# Patient Record
Sex: FEMALE | Race: WHITE | NOT HISPANIC OR LATINO | Employment: OTHER | URBAN - METROPOLITAN AREA
[De-identification: names, ages, dates, MRNs, and addresses within clinical notes are randomized per-mention and may not be internally consistent; named-entity substitution may affect disease eponyms.]

---

## 2018-01-30 ENCOUNTER — OFFICE VISIT (OUTPATIENT)
Dept: URGENT CARE | Facility: CLINIC | Age: 58
End: 2018-01-30
Payer: COMMERCIAL

## 2018-01-30 VITALS
WEIGHT: 154 LBS | RESPIRATION RATE: 20 BRPM | HEIGHT: 69 IN | TEMPERATURE: 98.9 F | SYSTOLIC BLOOD PRESSURE: 122 MMHG | BODY MASS INDEX: 22.81 KG/M2 | DIASTOLIC BLOOD PRESSURE: 80 MMHG | HEART RATE: 78 BPM | OXYGEN SATURATION: 97 %

## 2018-01-30 DIAGNOSIS — J06.9 VIRAL UPPER RESPIRATORY TRACT INFECTION: ICD-10-CM

## 2018-01-30 PROCEDURE — 99203 OFFICE O/P NEW LOW 30 MIN: CPT | Performed by: INTERNAL MEDICINE

## 2018-01-30 ASSESSMENT — ENCOUNTER SYMPTOMS
PSYCHIATRIC NEGATIVE: 1
SPUTUM PRODUCTION: 1
HEADACHES: 0
CARDIOVASCULAR NEGATIVE: 1
EYES NEGATIVE: 1
SHORTNESS OF BREATH: 0
FEVER: 0
PALPITATIONS: 0
NECK PAIN: 0
NEUROLOGICAL NEGATIVE: 1
CHILLS: 1
SORE THROAT: 1
BACK PAIN: 0
NAUSEA: 0
COUGH: 1
DIARRHEA: 0
MYALGIAS: 1
WEIGHT LOSS: 0
BLOOD IN STOOL: 0
DIZZINESS: 0
VOMITING: 0

## 2018-01-30 ASSESSMENT — PATIENT HEALTH QUESTIONNAIRE - PHQ9: CLINICAL INTERPRETATION OF PHQ2 SCORE: 0

## 2018-01-31 NOTE — PROGRESS NOTES
"Subjective:     Yanira Guerrier is a 57 y.o. female who presents for Nasal Congestion (Over a month stuffy nose , postnasal dripping, fatigue)       Patient is a pleasant 57-year-old female presents today for several days of nasal congestion and stuffy nose and mild cough and mild headache. Patient has not had a flu shot this year. Patient denies any rash. Does have a history of vertigo had small episode several days ago with mild nausea and vomiting none today.      Review of Systems   Constitutional: Positive for chills and malaise/fatigue. Negative for fever and weight loss.   HENT: Positive for congestion and sore throat.    Eyes: Negative.    Respiratory: Positive for cough and sputum production. Negative for shortness of breath.    Cardiovascular: Negative.  Negative for chest pain, palpitations and leg swelling.   Gastrointestinal: Negative for blood in stool, diarrhea, nausea and vomiting.   Genitourinary: Negative for dysuria, frequency and urgency.   Musculoskeletal: Positive for myalgias. Negative for back pain and neck pain.   Skin: Negative.  Negative for rash.   Neurological: Negative.  Negative for dizziness (negative headache) and headaches.   Endo/Heme/Allergies: Negative.    Psychiatric/Behavioral: Negative.      No Known Allergies   Objective:   /80   Pulse 78   Temp 37.2 °C (98.9 °F)   Resp 20   Ht 1.753 m (5' 9\")   Wt 69.9 kg (154 lb)   SpO2 97%   BMI 22.74 kg/m²   Physical Exam   Constitutional: She is oriented to person, place, and time. She appears well-developed and well-nourished. She is active. She has a sickly appearance. No distress.   HENT:   Head: Normocephalic and atraumatic.   Right Ear: External ear normal.   Left Ear: External ear normal.   Nose: Mucosal edema and rhinorrhea present.   Mouth/Throat: Posterior oropharyngeal erythema present. No oropharyngeal exudate.   Eyes: Conjunctivae and EOM are normal. Pupils are equal, round, and reactive to light. Right eye " exhibits no discharge. Left eye exhibits no discharge. No scleral icterus.   Neck: Normal range of motion. Neck supple. No JVD present. Carotid bruit is not present. No thyroid mass and no thyromegaly present.   Cardiovascular: Normal rate, regular rhythm, S1 normal, S2 normal and normal heart sounds.  Exam reveals no friction rub.    No murmur heard.  Pulmonary/Chest: Effort normal and breath sounds normal. No respiratory distress. She has no wheezes. She has no rales.   Abdominal: Soft. Bowel sounds are normal. She exhibits no distension and no mass. There is no hepatosplenomegaly. There is no tenderness. There is no rebound and no guarding.   Musculoskeletal: Normal range of motion. She exhibits no edema.   Lymphadenopathy:     She has no cervical adenopathy.   Neurological: She is alert and oriented to person, place, and time. She has normal strength. No cranial nerve deficit.   Skin: Skin is warm and dry. No rash noted. No erythema.   Psychiatric: She has a normal mood and affect. Her behavior is normal. Thought content normal.        Assessment/Plan:   Assessment    Differential diagnosis, natural history, supportive care, and indications for immediate follow-up discussed.    DX:  Viral URI    TX: Otc flu and cold medications   PO fluids   Rest   Tylenol   Follow up with PCP   RTC or ED for any worsening symptoms

## 2018-04-23 ENCOUNTER — OFFICE VISIT (OUTPATIENT)
Dept: MEDICAL GROUP | Facility: MEDICAL CENTER | Age: 58
End: 2018-04-23
Payer: COMMERCIAL

## 2018-04-23 VITALS
HEART RATE: 60 BPM | HEIGHT: 69 IN | DIASTOLIC BLOOD PRESSURE: 66 MMHG | TEMPERATURE: 97.5 F | OXYGEN SATURATION: 98 % | BODY MASS INDEX: 22.07 KG/M2 | SYSTOLIC BLOOD PRESSURE: 102 MMHG | WEIGHT: 149 LBS

## 2018-04-23 DIAGNOSIS — J30.1 CHRONIC SEASONAL ALLERGIC RHINITIS DUE TO POLLEN: ICD-10-CM

## 2018-04-23 DIAGNOSIS — E78.00 PURE HYPERCHOLESTEROLEMIA: ICD-10-CM

## 2018-04-23 DIAGNOSIS — F41.9 ANXIETY: ICD-10-CM

## 2018-04-23 DIAGNOSIS — Z76.89 ENCOUNTER TO ESTABLISH CARE WITH NEW DOCTOR: ICD-10-CM

## 2018-04-23 PROCEDURE — 99214 OFFICE O/P EST MOD 30 MIN: CPT | Performed by: FAMILY MEDICINE

## 2018-04-23 RX ORDER — BUTYROSPERMUM PARKII(SHEA BUTTER), SIMMONDSIA CHINENSIS (JOJOBA) SEED OIL, ALOE BARBADENSIS LEAF EXTRACT .01; 1; 3.5 G/100G; G/100G; G/100G
LIQUID TOPICAL
COMMUNITY
Start: 2018-04-23

## 2018-04-23 RX ORDER — MAG HYDROX/ALUMINUM HYD/SIMETH 400-400-40
2800 SUSPENSION, ORAL (FINAL DOSE FORM) ORAL DAILY
COMMUNITY
Start: 2018-04-23

## 2018-04-23 NOTE — LETTER
[facility]  Zia Iraheta M.D.  [address]  [place] [address]  Fax: [phone]   Authorization for Release/Disclosure of   Protected Health Information   Name: TULIO GUERRIER : 1960 SSN: [id number]   Address: [address] [place] [address] [address] [place] [address] Phone:    [phone] (home)    I authorize the entity listed below to release/disclose the PHI below to:   [facility]/Zia Iraheta M.D. and Zia Iraheta M.D.   Provider or Entity Name:  [facility], [facility], [facility]   Phone:      Fax:     Reason for request: continuity of care   Information to be released:    [  ] LAST COLONOSCOPY,  including any PATH REPORT and follow-up  [  ] LAST FIT/COLOGUARD RESULT [  ] LAST DEXA  [ X ] LAST MAMMOGRAM  [ X ] LAST PAP  [  ] LAST LABS [  ] RETINA EXAM REPORT  [ X ] IMMUNIZATION RECORDS  [  ] Release all info      [  ] Check here and initial the line next to each item to release ALL health information INCLUDING  _____ Care and treatment for drug and / or alcohol abuse  _____ HIV testing, infection status, or AIDS  _____ Genetic Testing    DATES OF SERVICE OR TIME PERIOD TO BE DISCLOSED: _____________  I understand and acknowledge that:  * This Authorization may be revoked at any time by you in writing, except if your health information has already been used or disclosed.  * Your health information that will be used or disclosed as a result of you signing this authorization could be re-disclosed by the recipient. If this occurs, your re-disclosed health information may no longer be protected by State or Federal laws.  * You may refuse to sign this Authorization. Your refusal will not affect your ability to obtain treatment.  * This Authorization becomes effective upon signing and will  on (date) __________.      If no date is indicated, this Authorization will  one (1) year from the signature date.    Name: Tulio Guerrier    Signature:   Date:      4/23/2018       PLEASE FAX REQUESTED RECORDS BACK TO: [phone]

## 2018-04-23 NOTE — ASSESSMENT & PLAN NOTE
Chronic, stable, well controlled on when necessary usage of alprazolam. Patient states that she typically will take this as 0.25 mg by mouth daily at bedtime when necessary, and that a bottle of 30 tablets can last for up to 4 months.    ROS is NEGATIVE for generalized weakness/fatigue, dizziness, vision/hearing changes, chest pain/pressure, palpitations, dyspnea, tachypnea, intense feelings of dread, insomnia, decreased appetite, persistent nausea.

## 2018-04-23 NOTE — PATIENT INSTRUCTIONS
"Dr. Iraheta's tips for \"Lifestyle Medicine:\"     Check out the talk/documentary on \"How not to die\" by Dr. Dionte Alvarez (on his website nutritionfacts.org, he also authored a book with this title).       1) Make SMART lifestyle changes: Specific, Measurable, Attainable, Relevant, Time-sensitive.  The lifestyle changes that you need to make are with regards to: nutrition, cardiovascular exercise, sleep, stress management.  Make these changes every 2 weeks, revisiting the previous goals and perhaps revising them and/or setting new ones.       2) Nutrition: Make as many changes as you can to increase the amount of whole-foods (not Whole Foods, necessarily!  ;-)), plant-based diet as possible:   A) Books: Eat to Live (Dr. Scotty Oropeza), The Spectrum (Dr. Chau Philip), The Starch Solution (Dr. Tello Valdez)      B) Documentaries (can usually be found on [employer]): [place].  Fat, Sick, and Nearly Dead.  Fed Up.           3) Cardiovascular Exercise: The center for disease control recommends a minimum of 150 minutes per week of moderate intensity cardiovascular exercise for weight maintenance and cardiovascular health.  Set this as your initial goal, with at least 30 minutes per session. Types of exercise can include 30 minutes of elliptical, 30 minutes of decently fast jog, 30 minutes of swimming, 30 minutes of heavy gardening (lifting big bags of fertilizer, digging deep holes/ditches).  He can cut down the minute requirements to half, by doing higher intensity sports such as a game of tennis, or soccer.  He notes the library and check out with they have for home exercise programs, as well.       4) Sleep:    A) Goal: Obtain a minimum of 7-8hours of continuous, uninterrupted, restful sleep per night.    B) Tips for Sleep Hygiene:    I) Go to bed and wake up at consistent times whether work/school day or not.     II) Keep room dark, quiet, and comfortable.  Increase exposure to sunlight during awake times and " avoid bright lights (especially anything with a backlight) at least the last 1-2hours before going to sleep.     III) Don't nap.     IV) Avoid stimulant or caffeine use more than 4 hours after wake time.        5) Stress Management: You cannot change the stresses of life dizziness necessarily, but you can change how he responds of them. One good way to manage stress is to write things down in order to help you process how to approach things in general or specifically. Another good way is to talk it out with someone you trusts, specifically your significant other or good friend. A definite great way to deal with stress is to have cardiovascular exercise!

## 2018-04-23 NOTE — ASSESSMENT & PLAN NOTE
"Review of patient's most recent labs from October 2017 reveals that she has pure hypercholesterolemia with elevation of both total as well as LDL cholesterol. Otherwise, HDL was excellent at 86, and triglycerides were within normal limits.    Since that time, patient has made modifications to her nutrition, with dietary history as below. Patient is also taking additional sources of fiber in terms of supplements, as well as fish oil, all as documented in the medication section today.    ROS is NEGATIVE for dizziness, generalized weakness/fatigue, cold sweats, dizziness,  vision/hearing changes, jaw pain/paresthesias, BUE pain/paresthesias/numbness/weakness, chest pain/pressure, palpitations, dyspnea, nausea, RUQ abdominal pain, oliguria/anuria, BLE edema.    B: Oatmeal or Eggs, Coffee 1x/day with half & half or coconut milk (more often)  L: Salad w/dressing (vinagrette usually, sometimes ranch; not iceberg)  D: Meat or fish, salad, potatoes (roasted or baked)    Of note, the patient used to eat ice cream, frozen yogurt, and \"a lot of\" cheese.    "

## 2018-04-23 NOTE — LETTER
[facility]  Zia Iraheta M.D.  [address]  [place] [address]  Fax: [phone]   Authorization for Release/Disclosure of   Protected Health Information   Name: TULIO CARRIZALES : 1960 SSN: [id number]   Address: [address] [place] [address] [place] [address] Phone:    [phone] (home)    I authorize the entity listed below to release/disclose the PHI below to:   [facility]/Zia Iraheta M.D. and Zia Iraheta M.D.   Provider or Entity Name:  [facility]   Address   [facility]            [address], [place] [address] Phone:  [phone]      Fax:       [phone]          Reason for request: continuity of care   Information to be released:    [ X ] LAST COLONOSCOPY,  including any PATH REPORT and follow-up  [ X ] LAST FIT/COLOGUARD RESULT [  ] LAST DEXA  [  ] LAST MAMMOGRAM  [  ] LAST PAP  [  ] LAST LABS [  ] RETINA EXAM REPORT  [  ] IMMUNIZATION RECORDS  [  ] Release all info      [  ] Check here and initial the line next to each item to release ALL health information INCLUDING  _____ Care and treatment for drug and / or alcohol abuse  _____ HIV testing, infection status, or AIDS  _____ Genetic Testing    DATES OF SERVICE OR TIME PERIOD TO BE DISCLOSED: _____________  I understand and acknowledge that:  * This Authorization may be revoked at any time by you in writing, except if your health information has already been used or disclosed.  * Your health information that will be used or disclosed as a result of you signing this authorization could be re-disclosed by the recipient. If this occurs, your re-disclosed health information may no longer be protected by State or Federal laws.  * You may refuse to sign this Authorization. Your refusal will not affect your ability to obtain treatment.  * This Authorization becomes effective upon signing and will  on (date) __________.      If no date is indicated, this Authorization will  one (1)  year from the signature date.    Name: Yanira Muse Chey    Signature:   Date:     4/23/2018       PLEASE FAX REQUESTED RECORDS BACK TO: [phone]

## 2018-04-23 NOTE — PROGRESS NOTES
"Subjective:   Chief Complaint/History of Present Illness:  Yanira Guerrier is a 57 y.o. female established patient who presents today to establish primary medical care, as well as to discuss medical problems as listed below    Diagnoses of Encounter to establish care with new doctor, Pure hypercholesterolemia, Chronic seasonal allergic rhinitis due to pollen, and Anxiety were pertinent to this visit.    Pure hypercholesterolemia  Review of patient's most recent labs from October 2017 reveals that she has pure hypercholesterolemia with elevation of both total as well as LDL cholesterol. Otherwise, HDL was excellent at 86, and triglycerides were within normal limits.    Since that time, patient has made modifications to her nutrition, with dietary history as below. Patient is also taking additional sources of fiber in terms of supplements, as well as fish oil, all as documented in the medication section today.    ROS is NEGATIVE for dizziness, generalized weakness/fatigue, cold sweats, dizziness,  vision/hearing changes, jaw pain/paresthesias, BUE pain/paresthesias/numbness/weakness, chest pain/pressure, palpitations, dyspnea, nausea, RUQ abdominal pain, oliguria/anuria, BLE edema.    B: Oatmeal or Eggs, Coffee 1x/day with half & half or coconut milk (more often)  L: Salad w/dressing (vinagrette usually, sometimes ranch; not iceberg)  D: Meat or fish, salad, potatoes (roasted or baked)    Of note, the patient used to eat ice cream, frozen yogurt, and \"a lot of\" cheese.      Chronic seasonal allergic rhinitis due to pollen  Chronic, stable, controlled. However, patient states that she had an exacerbation last springtime which was not controlled on over-the-counter antihistamines alone. However, since she is currently asymptomatic, patient is declining Kenalog shot.    Of note, patient reports that her previous response, shot only lasted approximately 2 weeks.    ROS is NEGATIVE for increased lacrimation, b/l itchy " eyes,  rhinorrhea, post-nasal drip, sneezing, wheezing, dyspnea, respiratory distress, palpitations, tachycardia, rash, generalized pruritis, rash/hives.    Anxiety  Chronic, stable, well controlled on when necessary usage of alprazolam. Patient states that she typically will take this as 0.25 mg by mouth daily at bedtime when necessary, and that a bottle of 30 tablets can last for up to 4 months.    ROS is NEGATIVE for generalized weakness/fatigue, dizziness, vision/hearing changes, chest pain/pressure, palpitations, dyspnea, tachypnea, intense feelings of dread, insomnia, decreased appetite, persistent nausea.      Patient Active Problem List    Diagnosis Date Noted   • Pure hypercholesterolemia 04/23/2018   • Chronic seasonal allergic rhinitis due to pollen 04/23/2018   • Anxiety 04/23/2018   • Biliary calculus with cholecystitis 02/16/2016       Additional History:   Allergies:    Patient has no known allergies.     Current Medications:     Current Outpatient Prescriptions   Medication Sig Dispense Refill   • Omega-3 Fatty Acids (FISH OIL TRIPLE STRENGTH) 1400 MG Cap Take 2,800 mg by mouth every day.     • Plant Sterols and Stanols 450 MG Cap Take  by mouth.     • Probiotic Product (PROBIOMAX DAILY DF) Cap Take  by mouth.     • Psyllium 0.52 GM Cap Take  by mouth. 540 Cap    • alprazolam (XANAX) 0.5 MG Tab Take 0.25 mg by mouth at bedtime as needed for Sleep.     • Calcium-Magnesium-Vitamin D (CALCIUM 500 PO) Take  by mouth every day.     • Multiple Vitamin (MULTI VITAMIN DAILY PO) Take 1 Tab by mouth every day.       No current facility-administered medications for this visit.         Social History:     Social History   Substance Use Topics   • Smoking status: Never Smoker   • Smokeless tobacco: Never Used   • Alcohol use Yes      Comment: 1/week       ROS:     - NOTE: All other systems reviewed and are negative, except as in HPI.     Objective:   Physical Exam:    Vitals: Blood pressure 102/66, pulse 60,  "temperature 36.4 °C (97.5 °F), height 1.753 m (5' 9\"), weight 67.6 kg (149 lb), SpO2 98 %.   BMI: Body mass index is 22 kg/m².   General/Constitutional: Vitals as above, Well nourished, well developed female in no acute distress   Head/Eyes: Head is grossly normal & atraumatic, bilateral conjunctivae clear and not injected, bilateral EOMI, bilateral PERRL   ENT: Bilateral external ears grossly normal in appearance, Hearing grossly intact, External nares normal in appearance and without discharge/bleeding   Respiratory: No respiratory distress, bilateral lungs are clear to ausculation in all lung fields (anterior/lateral/posterior), no wheezing/rhonchi/rales   Cardiovascular: Regular rate and rhythm without murmur/gallops/rubs, distal pulses are intact and equal bilaterally (radial, posterior tibial), no bilateral lower extremity edema   MSK: Gait grossly normal & not antalgic   Integumentary: No apparent rashes   Psych: Judgment grossly appropriate, no apparent depression/anxiety      Health Maintenance:     - We'll request previous medical records from [facility], as well as Dr. Tello Aragon's office (pap, mammo).    Imaging/Labs:     - Reviewed and interpreted as in history of present illness above, and scanned into .    Assessment and Plan:   1. Encounter to establish care with new doctor  Patient transferring primary medical care to me from Dr. Tello Moran.    2. Pure hypercholesterolemia  Chronic, uncontrolled.     A) Lifestyle changes: Patient and I discussed the importance of lifestyle changes, with particular emphasis on plant-based nutrition (for the purposes of weight loss, general health, HLD), as well as cardiovascular exercise, proper sleep, and stress management.  This discussion is briefly summarized in the patient instruction section below.  Patient verbalized understanding.   B) Continue supplements as below.    - Omega-3 Fatty Acids (FISH OIL TRIPLE STRENGTH) 1400 MG Cap; " Take 2,800 mg by mouth every day.    - Plant Sterols and Stanols 450 MG Cap; Take  by mouth.    - Psyllium 0.52 GM Cap; Take  by mouth.  Dispense: 540 Cap   C) Evaluation: Labs in 3-6 months to evaluate response to lifestyle changes.    - LIPID PROFILE; Future    3. Chronic seasonal allergic rhinitis due to pollen  Chronic, stable, well controlled at present time.  Consider Kenalog shot in the future.    4. Anxiety  Chronic, stable, well controlled at present time. Review of [facility] reveals the patient last filled alprazolam in February 2018.  Continue when necessary usage of alprazolam.      RTC: in 2 months for management of anxiety, follow-up on previous records, and discussion of lifestyle changes.    PLEASE NOTE: This dictation was created using voice recognition software. I have made every reasonable attempt to correct obvious errors, but I expect that there are errors of grammar and possibly content that I did not discover before finalizing the note.

## 2018-04-23 NOTE — ASSESSMENT & PLAN NOTE
Chronic, stable, controlled. However, patient states that she had an exacerbation last springtime which was not controlled on over-the-counter antihistamines alone. However, since she is currently asymptomatic, patient is declining Kenalog shot.    Of note, patient reports that her previous response, shot only lasted approximately 2 weeks.    ROS is NEGATIVE for increased lacrimation, b/l itchy eyes,  rhinorrhea, post-nasal drip, sneezing, wheezing, dyspnea, respiratory distress, palpitations, tachycardia, rash, generalized pruritis, rash/hives.

## 2018-07-19 ENCOUNTER — OFFICE VISIT (OUTPATIENT)
Dept: MEDICAL GROUP | Facility: MEDICAL CENTER | Age: 58
End: 2018-07-19
Payer: COMMERCIAL

## 2018-07-19 VITALS
BODY MASS INDEX: 22.22 KG/M2 | TEMPERATURE: 99.3 F | HEIGHT: 69 IN | SYSTOLIC BLOOD PRESSURE: 100 MMHG | OXYGEN SATURATION: 94 % | HEART RATE: 66 BPM | WEIGHT: 150 LBS | DIASTOLIC BLOOD PRESSURE: 74 MMHG

## 2018-07-19 DIAGNOSIS — M41.25 OTHER IDIOPATHIC SCOLIOSIS, THORACOLUMBAR REGION: ICD-10-CM

## 2018-07-19 DIAGNOSIS — Z00.00 ANNUAL PHYSICAL EXAM: ICD-10-CM

## 2018-07-19 DIAGNOSIS — S76.011A MUSCLE STRAIN OF GLUTEAL REGION, RIGHT, INITIAL ENCOUNTER: ICD-10-CM

## 2018-07-19 DIAGNOSIS — Z13.6 ENCOUNTER FOR LIPID SCREENING FOR CARDIOVASCULAR DISEASE: ICD-10-CM

## 2018-07-19 DIAGNOSIS — S76.319A HAMSTRING STRAIN, INITIAL ENCOUNTER: ICD-10-CM

## 2018-07-19 DIAGNOSIS — M76.31 ILIOTIBIAL BAND SYNDROME, RIGHT LEG: ICD-10-CM

## 2018-07-19 DIAGNOSIS — Z13.1 DIABETES MELLITUS SCREENING: ICD-10-CM

## 2018-07-19 DIAGNOSIS — Z13.220 ENCOUNTER FOR LIPID SCREENING FOR CARDIOVASCULAR DISEASE: ICD-10-CM

## 2018-07-19 PROCEDURE — 99214 OFFICE O/P EST MOD 30 MIN: CPT | Performed by: FAMILY MEDICINE

## 2018-07-22 NOTE — ASSESSMENT & PLAN NOTE
New problem, uncontrolled, please see notes from same date of service 07/19/18 re: hamstring strain.

## 2018-07-22 NOTE — PROGRESS NOTES
Subjective:   Chief Complaint/History of Present Illness:  Yanira Guerrier is a 57 y.o. female established patient who presents today to discuss medical problems as listed below    Diagnoses of Hamstring strain, initial encounter, Muscle strain of gluteal region, right, initial encounter, Iliotibial band syndrome, right leg, Other idiopathic scoliosis, thoracolumbar region, Annual physical exam, Encounter for lipid screening for cardiovascular disease, and Diabetes mellitus screening were pertinent to this visit.    Hamstring strain, initial encounter  New problem, uncontrolled, patient with back pain and RLE pain/aches over the last week or so.  Patient states that she may have strained her RLE while doing weight training with a machine that caused her leg to adduct and/or abduct weights using a relatively straight-legged position (I.e. While standing).    Patient has tried some stretches at home, but has not used OTC analgesics, yet.    Patient denies any blunt force trauma injury.      ROS is NEGATIVE for BLE radicular pain, saddle paresthesias, bowel or bladder incontinence, gait instability    Muscle strain of gluteal region, right, initial encounter  New problem, uncontrolled, please see notes from same date of service 07/19/18 re: hamstring strain.    Iliotibial band syndrome, right leg  New problem, uncontrolled, please see notes from same date of service 07/19/18 re: hamstring strain.    Idiopathic scoliosis of thoracolumbar spine  Discovered on physical exam.  Otherwise, new problem for my medical evaluation, unknown control, please see notes from same date of service 07/19/18 re: hamstring strain.      Patient Active Problem List    Diagnosis Date Noted   • Hamstring strain, initial encounter 07/19/2018   • Muscle strain of gluteal region, right, initial encounter 07/19/2018   • Iliotibial band syndrome, right leg 07/19/2018   • Idiopathic scoliosis of thoracolumbar spine 07/19/2018   • Pure  "hypercholesterolemia 04/23/2018   • Chronic seasonal allergic rhinitis due to pollen 04/23/2018   • Anxiety 04/23/2018   • Biliary calculus with cholecystitis 02/16/2016       Additional History:   Allergies:    Patient has no known allergies.     Current Medications:     Current Outpatient Prescriptions   Medication Sig Dispense Refill   • Omega-3 Fatty Acids (FISH OIL TRIPLE STRENGTH) 1400 MG Cap Take 2,800 mg by mouth every day.     • Plant Sterols and Stanols 450 MG Cap Take  by mouth.     • Probiotic Product (PROBIOMAX DAILY DF) Cap Take  by mouth.     • Psyllium 0.52 GM Cap Take  by mouth. 540 Cap    • alprazolam (XANAX) 0.5 MG Tab Take 0.25 mg by mouth at bedtime as needed for Sleep.     • Calcium-Magnesium-Vitamin D (CALCIUM 500 PO) Take  by mouth every day.     • Multiple Vitamin (MULTI VITAMIN DAILY PO) Take 1 Tab by mouth every day.       No current facility-administered medications for this visit.         Social History:     Social History   Substance Use Topics   • Smoking status: Never Smoker   • Smokeless tobacco: Never Used   • Alcohol use Yes      Comment: 1/week       ROS:     - NOTE: All other systems reviewed and are negative, except as in HPI.     Objective:   Physical Exam:    Vitals: Blood pressure 100/74, pulse 66, temperature 37.4 °C (99.3 °F), height 1.753 m (5' 9\"), weight 68 kg (150 lb), SpO2 94 %.   BMI: Body mass index is 22.15 kg/m².   General/Constitutional: Vitals as above, Well nourished, well developed female in no acute distress   Head/Eyes: Head is grossly normal & atraumatic, bilateral conjunctivae clear and not injected, bilateral EOMI, bilateral PERRL   ENT: Bilateral external ears grossly normal in appearance, Hearing grossly intact, External nares normal in appearance and without discharge/bleeding   Respiratory: No respiratory distress, bilateral lungs are clear to ausculation in all lung fields (anterior/lateral/posterior), no wheezing/rhonchi/rales   Cardiovascular: " Regular rate and rhythm without murmur/gallops/rubs, distal pulses are intact and equal bilaterally (radial, posterior tibial), no bilateral lower extremity edema   MSK: Bilateral hamstring aching strain and tightness that is very pronounced bilaterally, R gluteal tighteness and pain on straight leg raise on right, no low back pain/instability of bilateral straight leg raise, positive Tiara test on right leg, negative FADIR testing on right, palpable scoliosis of thoracolumbar back, Gait grossly normal & not antalgic   Integumentary: No apparent rashes   Psych: Judgment grossly appropriate, no apparent depression/anxiety    Health Maintenance:     - Not addressed at this visit    Imaging/Labs:     - Not addressed at this visit    Assessment and Plan:   1. Hamstring strain, initial encounter  New problem, uncontrolled, bilateral hamstring strain.  Patient given conservative care instructions (NSAIDs, stretching, warm & cold compresses, OTC oral and topical analgesics) as well as instructions re: stretches sourced from Sports Medicine Advisor regarding hamstring strain    2. Muscle strain of gluteal region, right, initial encounter  New problem, uncontrolled, see plan as in #1 above    3. Iliotibial band syndrome, right leg  New problem, uncontrolled, see plan as in #1 above    4. Other idiopathic scoliosis, thoracolumbar region  New problem for medical evaluation, uncontrolled, evaluate with xrays as below.   - DX-LUMBAR SPINE-2 OR 3 VIEWS; Future   - DX-THORACIC SPINE-2 VIEWS; Future    5. Annual physical exam  6. Encounter for lipid screening for cardiovascular disease  7. Diabetes mellitus screening  Unknown control of metabolic health. Labs as below to evaluate.   - HEMOGLOBIN A1C; Future   - COMP METABOLIC PANEL; Future   - LIPID PROFILE; Future      RTC: in 6weeks for follow-up on MSK strains, Annual Medical Exam.  Pt informed that I can order PT for her if the home PT exercises and conservative therapy is  insufficient to take care of her MSK issues.    PLEASE NOTE: This dictation was created using voice recognition software. I have made every reasonable attempt to correct obvious errors, but I expect that there are errors of grammar and possibly content that I did not discover before finalizing the note.

## 2018-07-22 NOTE — ASSESSMENT & PLAN NOTE
New problem, uncontrolled, patient with back pain and RLE pain/aches over the last week or so.  Patient states that she may have strained her RLE while doing weight training with a machine that caused her leg to adduct and/or abduct weights using a relatively straight-legged position (I.e. While standing).    Patient has tried some stretches at home, but has not used OTC analgesics, yet.    Patient denies any blunt force trauma injury.      ROS is NEGATIVE for BLE radicular pain, saddle paresthesias, bowel or bladder incontinence, gait instability

## 2018-07-22 NOTE — ASSESSMENT & PLAN NOTE
Discovered on physical exam.  Otherwise, new problem for my medical evaluation, unknown control, please see notes from same date of service 07/19/18 re: hamstring strain.

## 2018-07-26 ENCOUNTER — TELEPHONE (OUTPATIENT)
Dept: MEDICAL GROUP | Facility: MEDICAL CENTER | Age: 58
End: 2018-07-26

## 2018-07-26 DIAGNOSIS — S76.011A MUSCLE STRAIN OF GLUTEAL REGION, RIGHT, INITIAL ENCOUNTER: ICD-10-CM

## 2018-07-26 DIAGNOSIS — S76.319A HAMSTRING STRAIN, INITIAL ENCOUNTER: ICD-10-CM

## 2018-07-26 DIAGNOSIS — M41.25 OTHER IDIOPATHIC SCOLIOSIS, THORACOLUMBAR REGION: ICD-10-CM

## 2018-07-26 DIAGNOSIS — M76.31 ILIOTIBIAL BAND SYNDROME, RIGHT LEG: ICD-10-CM

## 2018-07-26 NOTE — TELEPHONE ENCOUNTER
1. Caller Name: Yanira Guerrier                                           Call Back Number: [phone] (home)        Patient approves a detailed voicemail message: yes    Patient would like you to order a x-ray of her upper left arm as she said she has talked to you about this before. She also would like to use [facility] on [address] for her PT.

## 2018-07-27 ENCOUNTER — HOSPITAL ENCOUNTER (OUTPATIENT)
Dept: RADIOLOGY | Facility: MEDICAL CENTER | Age: 58
End: 2018-07-27
Attending: FAMILY MEDICINE
Payer: COMMERCIAL

## 2018-07-27 DIAGNOSIS — M41.25 OTHER IDIOPATHIC SCOLIOSIS, THORACOLUMBAR REGION: ICD-10-CM

## 2018-07-27 PROCEDURE — 72070 X-RAY EXAM THORAC SPINE 2VWS: CPT

## 2018-07-27 PROCEDURE — 72100 X-RAY EXAM L-S SPINE 2/3 VWS: CPT

## 2018-07-30 PROBLEM — M41.25 IDIOPATHIC SCOLIOSIS OF THORACOLUMBAR SPINE: Status: RESOLVED | Noted: 2018-07-19 | Resolved: 2018-07-30

## 2018-07-30 NOTE — TELEPHONE ENCOUNTER
Based on the last two clinic encounter notes, there is no indication for her to have an Xray of her arm.  If she would like to come in to get evaluated for an arm fracture, she can schedule an appointment.      Otherwise, PT order for her leg musculoskeletal conditions has been ordered.

## 2018-08-06 DIAGNOSIS — S76.319A HAMSTRING STRAIN, INITIAL ENCOUNTER: ICD-10-CM

## 2018-08-06 DIAGNOSIS — M76.31 ILIOTIBIAL BAND SYNDROME, RIGHT LEG: ICD-10-CM

## 2018-08-06 DIAGNOSIS — S76.011A MUSCLE STRAIN OF GLUTEAL REGION, RIGHT, INITIAL ENCOUNTER: ICD-10-CM

## 2018-08-06 DIAGNOSIS — M54.5 BILATERAL LOW BACK PAIN, UNSPECIFIED CHRONICITY, WITH SCIATICA PRESENCE UNSPECIFIED: ICD-10-CM

## 2018-08-06 PROBLEM — M54.50 BILATERAL LOW BACK PAIN: Status: ACTIVE | Noted: 2018-08-06

## 2018-09-05 ENCOUNTER — TELEPHONE (OUTPATIENT)
Dept: MEDICAL GROUP | Facility: MEDICAL CENTER | Age: 58
End: 2018-09-05

## 2018-09-05 DIAGNOSIS — K21.00 GASTROESOPHAGEAL REFLUX DISEASE WITH ESOPHAGITIS: ICD-10-CM

## 2018-09-05 RX ORDER — OMEPRAZOLE 20 MG/1
CAPSULE, DELAYED RELEASE ORAL
Qty: 180 CAP | Refills: 0 | Status: SHIPPED | OUTPATIENT
Start: 2018-09-05 | End: 2018-12-03 | Stop reason: SDUPTHER

## 2018-09-05 NOTE — TELEPHONE ENCOUNTER
VOICEMAIL  1. Caller Name: Yanira Guerrier                        Call Back Number: [phone] (home)      2. Message: Patient called and left a mesage about needing a medications. I have called her back and let her know that I have put in for refill request on her medication.     3. Patient approves office to leave a detailed voicemail/MyChart message: N\A

## 2018-09-18 LAB
ALBUMIN SERPL-MCNC: 4.8 G/DL (ref 3.5–5.5)
ALBUMIN/GLOB SERPL: 2.3 {RATIO} (ref 1.2–2.2)
ALP SERPL-CCNC: 66 IU/L (ref 39–117)
ALT SERPL-CCNC: 12 IU/L (ref 0–32)
AST SERPL-CCNC: 20 IU/L (ref 0–40)
BILIRUB SERPL-MCNC: 0.8 MG/DL (ref 0–1.2)
BUN SERPL-MCNC: 14 MG/DL (ref 6–24)
BUN/CREAT SERPL: 18 (ref 9–23)
CALCIUM SERPL-MCNC: 9.5 MG/DL (ref 8.7–10.2)
CHLORIDE SERPL-SCNC: 102 MMOL/L (ref 96–106)
CHOLEST SERPL-MCNC: 231 MG/DL (ref 100–199)
CO2 SERPL-SCNC: 27 MMOL/L (ref 20–29)
CREAT SERPL-MCNC: 0.77 MG/DL (ref 0.57–1)
GLOBULIN SER CALC-MCNC: 2.1 G/DL (ref 1.5–4.5)
GLUCOSE SERPL-MCNC: 94 MG/DL (ref 65–99)
HBA1C MFR BLD: 5 % (ref 4.8–5.6)
HDLC SERPL-MCNC: 88 MG/DL
IF AFRICAN AMERICAN  100797: 99 ML/MIN/1.73
IF NON AFRICAN AMER 100791: 86 ML/MIN/1.73
LABORATORY COMMENT REPORT: ABNORMAL
LDLC SERPL CALC-MCNC: 127 MG/DL (ref 0–99)
POTASSIUM SERPL-SCNC: 4.1 MMOL/L (ref 3.5–5.2)
PROT SERPL-MCNC: 6.9 G/DL (ref 6–8.5)
SODIUM SERPL-SCNC: 143 MMOL/L (ref 134–144)
TRIGL SERPL-MCNC: 80 MG/DL (ref 0–149)
VLDLC SERPL CALC-MCNC: 16 MG/DL (ref 5–40)

## 2018-09-21 ENCOUNTER — APPOINTMENT (OUTPATIENT)
Dept: MEDICAL GROUP | Facility: MEDICAL CENTER | Age: 58
End: 2018-09-21
Payer: COMMERCIAL

## 2018-10-31 ENCOUNTER — HOSPITAL ENCOUNTER (OUTPATIENT)
Dept: HOSPITAL 8 - CFH | Age: 58
Discharge: HOME | End: 2018-10-31
Attending: FAMILY MEDICINE
Payer: COMMERCIAL

## 2018-10-31 DIAGNOSIS — Z12.31: Primary | ICD-10-CM

## 2018-10-31 PROCEDURE — 77063 BREAST TOMOSYNTHESIS BI: CPT

## 2018-11-07 ENCOUNTER — OFFICE VISIT (OUTPATIENT)
Dept: MEDICAL GROUP | Facility: MEDICAL CENTER | Age: 58
End: 2018-11-07
Payer: COMMERCIAL

## 2018-11-07 VITALS
HEART RATE: 70 BPM | TEMPERATURE: 98 F | OXYGEN SATURATION: 98 % | HEIGHT: 69 IN | BODY MASS INDEX: 21.74 KG/M2 | WEIGHT: 146.8 LBS

## 2018-11-07 DIAGNOSIS — M54.50 CHRONIC BILATERAL LOW BACK PAIN WITHOUT SCIATICA: ICD-10-CM

## 2018-11-07 DIAGNOSIS — M76.31 ILIOTIBIAL BAND SYNDROME, RIGHT LEG: ICD-10-CM

## 2018-11-07 DIAGNOSIS — Z83.438 FAMILY HISTORY OF MIXED HYPERLIPIDEMIA: ICD-10-CM

## 2018-11-07 DIAGNOSIS — E78.00 PURE HYPERCHOLESTEROLEMIA: ICD-10-CM

## 2018-11-07 DIAGNOSIS — G89.29 CHRONIC BILATERAL LOW BACK PAIN WITHOUT SCIATICA: ICD-10-CM

## 2018-11-07 DIAGNOSIS — S76.011A MUSCLE STRAIN OF GLUTEAL REGION, RIGHT, INITIAL ENCOUNTER: ICD-10-CM

## 2018-11-07 DIAGNOSIS — S76.319A HAMSTRING STRAIN, INITIAL ENCOUNTER: ICD-10-CM

## 2018-11-07 PROCEDURE — 99214 OFFICE O/P EST MOD 30 MIN: CPT | Performed by: FAMILY MEDICINE

## 2018-11-07 RX ORDER — PRAVASTATIN SODIUM 20 MG
20 TABLET ORAL DAILY
Qty: 90 TAB | Refills: 0 | Status: SHIPPED | OUTPATIENT
Start: 2018-11-07 | End: 2019-05-23

## 2018-11-10 PROBLEM — S76.319A HAMSTRING STRAIN, INITIAL ENCOUNTER: Status: RESOLVED | Noted: 2018-07-19 | Resolved: 2018-11-10

## 2018-11-10 PROBLEM — S76.011A MUSCLE STRAIN OF GLUTEAL REGION, RIGHT, INITIAL ENCOUNTER: Status: RESOLVED | Noted: 2018-07-19 | Resolved: 2018-11-10

## 2018-11-10 PROBLEM — M76.31 ILIOTIBIAL BAND SYNDROME, RIGHT LEG: Status: RESOLVED | Noted: 2018-07-19 | Resolved: 2018-11-10

## 2018-11-11 NOTE — ASSESSMENT & PLAN NOTE
New problem for medical evaluation, uncontrolled, we discussed that patient's inability to correct her dyslipidemia with dietary changes as well as use of over-the-counter cholesterol medications may be due to the fact that patient has underlying genetics which caused her to have predisposition to observe that more readily.  Specifically, we discussed lipoprotein A, as well as workup to discover what patients risk for ASCVD is apart from the ASCVD calculator (i.e., checking homocystine and CRP) is.  Patient verbalized understanding and would like to proceed.    ROS is NEGATIVE for dizziness, generalized weakness/fatigue, cold sweats,  vision/hearing changes, jaw pain/paresthesias, BUE pain/paresthesias/numbness/weakness, chest pain/pressure, palpitations, dyspnea, nausea, RUQ abdominal pain, oliguria/anuria, BLE edema.

## 2018-11-11 NOTE — PROGRESS NOTES
Subjective:   Chief Complaint/History of Present Illness:  Yanira Guerrier is a 57 y.o. female established patient who presents today to discuss medical problems as listed below    Diagnoses of Pure hypercholesterolemia, Family history of mixed hyperlipidemia, Chronic bilateral low back pain without sciatica, Muscle strain of gluteal region, right, initial encounter, Hamstring strain, initial encounter, and Iliotibial band syndrome, right leg were pertinent to this visit.    Pure hypercholesterolemia  Patient and I discussed recent labs (see below; pure hypercholesterolemia, uncontrolled) and that ASCVD risk is increased based on most recent lipid panel, current blood pressure (non-hypertensive), diabetes status (non-diabetic), and smoking status (non-smoker).    Patient and I then discussed necessary dietary changes to make to address dyslipidemia.  Patient is NOT currently taking cholesterol lowering medication, but we did discuss that she needs to get started on statin therapy as her cholesterol has not seemed to change with taking supplements to control cholesterol.  Patient verbalized understanding.    ROS is NEGATIVE for dizziness, generalized weakness/fatigue, vision/hearing changes, jaw pain/paresthesias, BUE pain/paresthesias/numbness/weakness, chest pain/pressure, palpitations, dyspnea, RUQ abdominal pain, oliguria/anuria, BLE edema.    Lab Results   Component Value Date/Time    CHOLSTRLTOT 231 (H) 09/17/2018 07:55 AM     (H) 09/17/2018 07:55 AM    HDL 88 09/17/2018 07:55 AM    TRIGLYCERIDE 80 09/17/2018 07:55 AM       Family history of mixed hyperlipidemia  New problem for medical evaluation, uncontrolled, we discussed that patient's inability to correct her dyslipidemia with dietary changes as well as use of over-the-counter cholesterol medications may be due to the fact that patient has underlying genetics which caused her to have predisposition to observe that more readily.  Specifically, we  discussed lipoprotein A, as well as workup to discover what patients risk for ASCVD is apart from the ASCVD calculator (i.e., checking homocystine and CRP) is.  Patient verbalized understanding and would like to proceed.    ROS is NEGATIVE for dizziness, generalized weakness/fatigue, cold sweats,  vision/hearing changes, jaw pain/paresthesias, BUE pain/paresthesias/numbness/weakness, chest pain/pressure, palpitations, dyspnea, nausea, RUQ abdominal pain, oliguria/anuria, BLE edema.     Bilateral low back pain  Chronic, stable, well-controlled at present time.    Muscle strain of gluteal region, right, initial encounter  Resolved.    Hamstring strain, initial encounter  Resolved.    Iliotibial band syndrome, right leg  Resolved.      Patient Active Problem List    Diagnosis Date Noted   • Family history of mixed hyperlipidemia 11/07/2018   • Gastroesophageal reflux disease with esophagitis 09/05/2018   • Bilateral low back pain 08/06/2018   • Pure hypercholesterolemia 04/23/2018   • Chronic seasonal allergic rhinitis due to pollen 04/23/2018   • Anxiety 04/23/2018       Additional History:   Allergies:    Patient has no known allergies.     Current Medications:     Current Outpatient Prescriptions   Medication Sig Dispense Refill   • pravastatin (PRAVACHOL) 20 MG Tab Take 1 Tab by mouth every day. 90 Tab 0   • omeprazole (PRILOSEC) 20 MG delayed-release capsule TAKE 2 CAPSULE BY ORAL ROUTE EVERY DAY 30 MINUTES TO 1 HOUR BEFORE A MEAL 180 Cap 0   • Omega-3 Fatty Acids (FISH OIL TRIPLE STRENGTH) 1400 MG Cap Take 2,800 mg by mouth every day.     • Plant Sterols and Stanols 450 MG Cap Take  by mouth.     • Probiotic Product (PROBIOMAX DAILY DF) Cap Take  by mouth.     • Psyllium 0.52 GM Cap Take  by mouth. 540 Cap    • alprazolam (XANAX) 0.5 MG Tab Take 0.25 mg by mouth at bedtime as needed for Sleep.     • Calcium-Magnesium-Vitamin D (CALCIUM 500 PO) Take  by mouth every day.     • Multiple Vitamin (MULTI VITAMIN DAILY  "PO) Take 1 Tab by mouth every day.       No current facility-administered medications for this visit.         Social History:     Social History   Substance Use Topics   • Smoking status: Never Smoker   • Smokeless tobacco: Never Used   • Alcohol use Yes      Comment: 1/week       ROS:     - NOTE: All other systems reviewed and are negative, except as in HPI.     Objective:   Physical Exam:    Vitals: Pulse 70, temperature 36.7 °C (98 °F), height 1.753 m (5' 9.02\"), weight 66.6 kg (146 lb 12.8 oz), SpO2 98 %.   BMI: Body mass index is 21.67 kg/m².   General/Constitutional: Vitals as above, Well nourished, well developed female in no acute distress   Head/Eyes: Head is grossly normal & atraumatic, bilateral conjunctivae clear and not injected, bilateral EOMI, bilateral PERRL   ENT: Bilateral external ears grossly normal in appearance, Hearing grossly intact, External nares normal in appearance and without discharge/bleeding   Respiratory: No respiratory distress, bilateral lungs are clear to ausculation in all lung fields (anterior/lateral/posterior), no wheezing/rhonchi/rales   Cardiovascular: Regular rate and rhythm without murmur/gallops/rubs, distal pulses are intact and equal bilaterally (radial, posterior tibial), no bilateral lower extremity edema   MSK: Gait grossly normal & not antalgic   Integumentary: No apparent rashes   Psych: Judgment grossly appropriate, no apparent depression/anxiety    Health Maintenance:     - Declines flu vaccine at this time    - 10/31/18 -- Mammo WNL    Imaging/Labs:     - 09/18/18 -- elevated LDL (bad) and total cholesterol levels, otherwise labs were normal    - 07/27/18 --- T & Lspine Xrays -- negative for scoliosis, and also negative for any significant bony abnormality    Assessment and Plan:   1. Pure hypercholesterolemia  Chronic, uncontrolled, patient advised to pursue lifestyle changes, particularly cardiovascular exercise and increasing proportion of plant-based " nutrition.  Additionally, patient to start statin, also to evaluate heart attack risk.    - pravastatin (PRAVACHOL) 20 MG Tab; Take 1 Tab by mouth every day.  Dispense: 90 Tab; Refill: 0   - LIPOPROTEIN A; Future   - CRP HIGH SENSITIVE (CARDIAC); Future   - HOMOCYSTEINE; Future   - Lipid Profile; Future    2. Family history of mixed hyperlipidemia  New problem, uncontrolled, see a/p as in #1 above   - LIPOPROTEIN A; Future   - CRP HIGH SENSITIVE (CARDIAC); Future   - HOMOCYSTEINE; Future    3. Chronic bilateral low back pain without sciatica  Chronic, uncontrolled, well-controlled.     4. Muscle strain of gluteal region, right, initial encounter  Resolved    5. Hamstring strain, initial encounter  Resolved    6. Iliotibial band syndrome, right leg  Resolved        RTC: in 6months for Annual Medical Exam, Lifestyle Changes management, Labs follow-up.    PLEASE NOTE: This dictation was created using voice recognition software. I have made every reasonable attempt to correct obvious errors, but I expect that there are errors of grammar and possibly content that I did not discover before finalizing the note.

## 2018-11-11 NOTE — ASSESSMENT & PLAN NOTE
Patient and I discussed recent labs (see below; pure hypercholesterolemia, uncontrolled) and that ASCVD risk is increased based on most recent lipid panel, current blood pressure (non-hypertensive), diabetes status (non-diabetic), and smoking status (non-smoker).    Patient and I then discussed necessary dietary changes to make to address dyslipidemia.  Patient is NOT currently taking cholesterol lowering medication, but we did discuss that she needs to get started on statin therapy as her cholesterol has not seemed to change with taking supplements to control cholesterol.  Patient verbalized understanding.    ROS is NEGATIVE for dizziness, generalized weakness/fatigue, vision/hearing changes, jaw pain/paresthesias, BUE pain/paresthesias/numbness/weakness, chest pain/pressure, palpitations, dyspnea, RUQ abdominal pain, oliguria/anuria, BLE edema.    Lab Results   Component Value Date/Time    CHOLSTRLTOT 231 (H) 09/17/2018 07:55 AM     (H) 09/17/2018 07:55 AM    HDL 88 09/17/2018 07:55 AM    TRIGLYCERIDE 80 09/17/2018 07:55 AM

## 2018-11-14 ENCOUNTER — PATIENT MESSAGE (OUTPATIENT)
Dept: MEDICAL GROUP | Facility: MEDICAL CENTER | Age: 58
End: 2018-11-14

## 2018-12-03 DIAGNOSIS — K21.00 GASTROESOPHAGEAL REFLUX DISEASE WITH ESOPHAGITIS: ICD-10-CM

## 2018-12-03 RX ORDER — OMEPRAZOLE 20 MG/1
CAPSULE, DELAYED RELEASE ORAL
Qty: 180 CAP | Refills: 2 | Status: SHIPPED | OUTPATIENT
Start: 2018-12-03 | End: 2019-12-06

## 2019-02-06 ENCOUNTER — APPOINTMENT (RX ONLY)
Dept: URBAN - METROPOLITAN AREA CLINIC 35 | Facility: CLINIC | Age: 59
Setting detail: DERMATOLOGY
End: 2019-02-06

## 2019-02-06 DIAGNOSIS — D22 MELANOCYTIC NEVI: ICD-10-CM

## 2019-02-06 DIAGNOSIS — L73.8 OTHER SPECIFIED FOLLICULAR DISORDERS: ICD-10-CM

## 2019-02-06 DIAGNOSIS — Z87.2 PERSONAL HISTORY OF DISEASES OF THE SKIN AND SUBCUTANEOUS TISSUE: ICD-10-CM

## 2019-02-06 DIAGNOSIS — L82.1 OTHER SEBORRHEIC KERATOSIS: ICD-10-CM

## 2019-02-06 DIAGNOSIS — Z71.89 OTHER SPECIFIED COUNSELING: ICD-10-CM

## 2019-02-06 DIAGNOSIS — L81.4 OTHER MELANIN HYPERPIGMENTATION: ICD-10-CM

## 2019-02-06 PROBLEM — D22.5 MELANOCYTIC NEVI OF TRUNK: Status: ACTIVE | Noted: 2019-02-06

## 2019-02-06 PROBLEM — D22.39 MELANOCYTIC NEVI OF OTHER PARTS OF FACE: Status: ACTIVE | Noted: 2019-02-06

## 2019-02-06 PROBLEM — D22.71 MELANOCYTIC NEVI OF RIGHT LOWER LIMB, INCLUDING HIP: Status: ACTIVE | Noted: 2019-02-06

## 2019-02-06 PROBLEM — D48.5 NEOPLASM OF UNCERTAIN BEHAVIOR OF SKIN: Status: ACTIVE | Noted: 2019-02-06

## 2019-02-06 PROBLEM — D22.62 MELANOCYTIC NEVI OF LEFT UPPER LIMB, INCLUDING SHOULDER: Status: ACTIVE | Noted: 2019-02-06

## 2019-02-06 PROBLEM — D22.61 MELANOCYTIC NEVI OF RIGHT UPPER LIMB, INCLUDING SHOULDER: Status: ACTIVE | Noted: 2019-02-06

## 2019-02-06 PROBLEM — D22.72 MELANOCYTIC NEVI OF LEFT LOWER LIMB, INCLUDING HIP: Status: ACTIVE | Noted: 2019-02-06

## 2019-02-06 PROCEDURE — 99213 OFFICE O/P EST LOW 20 MIN: CPT | Mod: 25

## 2019-02-06 PROCEDURE — ? COUNSELING

## 2019-02-06 PROCEDURE — ? SHAVE REMOVAL

## 2019-02-06 PROCEDURE — ? OBSERVATION AND MEASURE

## 2019-02-06 PROCEDURE — 11306 SHAVE SKIN LESION 0.6-1.0 CM: CPT

## 2019-02-06 ASSESSMENT — LOCATION DETAILED DESCRIPTION DERM
LOCATION DETAILED: MONS PUBIS
LOCATION DETAILED: INFERIOR MID FOREHEAD
LOCATION DETAILED: RIGHT INFERIOR ANTERIOR NECK
LOCATION DETAILED: RIGHT DISTAL POSTERIOR THIGH
LOCATION DETAILED: RIGHT ANTERIOR PROXIMAL THIGH
LOCATION DETAILED: LEFT MEDIAL BREAST 9-10:00 REGION
LOCATION DETAILED: RIGHT CENTRAL MALAR CHEEK
LOCATION DETAILED: LEFT MEDIAL SUPERIOR CHEST
LOCATION DETAILED: LEFT ANTERIOR PROXIMAL THIGH
LOCATION DETAILED: RIGHT DISTAL PLANTAR GREAT TOE
LOCATION DETAILED: EPIGASTRIC SKIN
LOCATION DETAILED: RIGHT ANTERIOR DISTAL UPPER ARM
LOCATION DETAILED: RIGHT ANTERIOR DISTAL THIGH
LOCATION DETAILED: RIGHT INFERIOR MEDIAL UPPER BACK
LOCATION DETAILED: UPPER STERNUM
LOCATION DETAILED: LEFT ANTECUBITAL SKIN
LOCATION DETAILED: LEFT MEDIAL UPPER BACK
LOCATION DETAILED: RIGHT ANTERIOR LATERAL DISTAL UPPER ARM
LOCATION DETAILED: LEFT ANTERIOR DISTAL UPPER ARM

## 2019-02-06 ASSESSMENT — LOCATION ZONE DERM
LOCATION ZONE: TOE
LOCATION ZONE: ARM
LOCATION ZONE: LEG
LOCATION ZONE: NECK
LOCATION ZONE: FACE
LOCATION ZONE: TRUNK
LOCATION ZONE: VULVA

## 2019-02-06 ASSESSMENT — LOCATION SIMPLE DESCRIPTION DERM
LOCATION SIMPLE: LEFT BREAST
LOCATION SIMPLE: ABDOMEN
LOCATION SIMPLE: RIGHT UPPER BACK
LOCATION SIMPLE: RIGHT UPPER ARM
LOCATION SIMPLE: LEFT THIGH
LOCATION SIMPLE: RIGHT ANTERIOR NECK
LOCATION SIMPLE: CHEST
LOCATION SIMPLE: LEFT UPPER ARM
LOCATION SIMPLE: RIGHT CHEEK
LOCATION SIMPLE: RIGHT THIGH
LOCATION SIMPLE: LEFT UPPER BACK
LOCATION SIMPLE: RIGHT POSTERIOR THIGH
LOCATION SIMPLE: GROIN
LOCATION SIMPLE: RIGHT GREAT TOE
LOCATION SIMPLE: INFERIOR FOREHEAD

## 2019-02-06 NOTE — PROCEDURE: SHAVE REMOVAL
Medical Necessity Clause: This procedure was medically necessary because the lesion that was treated was:
Render Post-Care Instructions In Note?: no
Billing Type: Third-Party Bill
Path Notes (To The Dermatopathologist): Please check margins.
Size Of Lesion In Cm (Required): 0.8
Hemostasis: Drysol
Detail Level: Detailed
Post-Care Instructions: I reviewed with the patient in detail post-care instructions. Patient is to keep the biopsy site dry overnight, and then apply bacitracin twice daily until healed. Patient may apply hydrogen peroxide soaks to remove any crusting.
X Size Of Lesion In Cm (Optional): 0
Wound Care: Petrolatum
Medical Necessity Information: It is in your best interest to select a reason for this procedure from the list below. All of these items fulfill various CMS LCD requirements except the new and changing color options.
Was A Bandage Applied: Yes
Anesthesia Volume In Cc: 0.6
Lab: [id number]
Anesthesia Type: 1% lidocaine with epinephrine
Lab Facility: 
Notification Instructions: Patient will be notified of biopsy results. However, patient instructed to call the office if not contacted within 2 weeks.
Consent was obtained from the patient. The risks and benefits to therapy were discussed in detail. Specifically, the risks of infection, scarring, bleeding, prolonged wound healing, incomplete removal, allergy to anesthesia, nerve injury and recurrence were addressed. Prior to the procedure, the treatment site was clearly identified and confirmed by the patient. All components of Universal Protocol/PAUSE Rule completed.
Biopsy Method: Dermablade

## 2019-03-06 ENCOUNTER — OFFICE VISIT (OUTPATIENT)
Dept: MEDICAL GROUP | Facility: MEDICAL CENTER | Age: 59
End: 2019-03-06
Payer: COMMERCIAL

## 2019-03-06 VITALS
RESPIRATION RATE: 16 BRPM | BODY MASS INDEX: 21.92 KG/M2 | OXYGEN SATURATION: 98 % | TEMPERATURE: 98.9 F | DIASTOLIC BLOOD PRESSURE: 80 MMHG | HEIGHT: 69 IN | HEART RATE: 66 BPM | SYSTOLIC BLOOD PRESSURE: 100 MMHG | WEIGHT: 148 LBS

## 2019-03-06 DIAGNOSIS — J06.9 VIRAL URI WITH COUGH: ICD-10-CM

## 2019-03-06 DIAGNOSIS — J34.89 SINUS PRESSURE: ICD-10-CM

## 2019-03-06 PROCEDURE — 99214 OFFICE O/P EST MOD 30 MIN: CPT | Performed by: NURSE PRACTITIONER

## 2019-03-06 RX ORDER — DOXYCYCLINE HYCLATE 100 MG
100 TABLET ORAL 2 TIMES DAILY
Qty: 20 TAB | Refills: 0 | Status: SHIPPED | OUTPATIENT
Start: 2019-03-06 | End: 2019-05-23

## 2019-03-06 ASSESSMENT — PATIENT HEALTH QUESTIONNAIRE - PHQ9: CLINICAL INTERPRETATION OF PHQ2 SCORE: 0

## 2019-03-07 PROBLEM — J06.9 VIRAL URI WITH COUGH: Status: ACTIVE | Noted: 2019-03-07

## 2019-03-07 NOTE — PROGRESS NOTES
Subjective:     Chief Complaint   Patient presents with   • Cough     CHEST AND HEAD CONGESTION X11 DAYS      Yanira Guerrier is a 58 y.o. female established patient of Dr. Iraheta here for evaluation of cough and acute congestion.  She states that she has been in the process of remodeling her house, there is been quite a bit of dust related to this construction.  Last week she developed chest congestion and nonproductive cough.  The cough has somewhat improved but she now has acute nasal congestion, sinus pressure, headache, thick nasal mucus.  She leaves in 2 days for [place] and is concerned that she may worsen while she is gone.  She has been taking Mucinex with only slight relief.  She denies fever, focal facial pain, nausea, shortness of breath, otalgia, sore throat    No problem-specific Assessment & Plan notes found for this encounter.       Current medicines (including changes today)  Current Outpatient Prescriptions   Medication Sig Dispense Refill   • doxycycline (VIBRAMYCIN) 100 MG Tab Take 1 Tab by mouth 2 times a day. 20 Tab 0   • omeprazole (PRILOSEC) 20 MG delayed-release capsule TAKE 2 CAPSULE BY ORAL ROUTE EVERY DAY 30 MINUTES TO 1 HOUR BEFORE A MEAL 180 Cap 2   • pravastatin (PRAVACHOL) 20 MG Tab Take 1 Tab by mouth every day. 90 Tab 0   • Omega-3 Fatty Acids (FISH OIL TRIPLE STRENGTH) 1400 MG Cap Take 2,800 mg by mouth every day.     • Plant Sterols and Stanols 450 MG Cap Take  by mouth.     • Probiotic Product (PROBIOMAX DAILY DF) Cap Take  by mouth.     • Psyllium 0.52 GM Cap Take  by mouth. 540 Cap    • alprazolam (XANAX) 0.5 MG Tab Take 0.25 mg by mouth at bedtime as needed for Sleep.     • Calcium-Magnesium-Vitamin D (CALCIUM 500 PO) Take  by mouth every day.     • Multiple Vitamin (MULTI VITAMIN DAILY PO) Take 1 Tab by mouth every day.       No current facility-administered medications for this visit.      She  has a past medical history of Heart burn; Hiatus hernia syndrome; Indigestion;  "and Pain (2/2016). She also has no past medical history of Anesthesia.    ROS included above     Objective:     Blood pressure 100/80, pulse 66, temperature 37.2 °C (98.9 °F), temperature source Temporal, resp. rate 16, height 1.753 m (5' 9.02\"), weight 67.1 kg (148 lb), SpO2 98 %. Body mass index is 21.85 kg/m².     Physical Exam:  General: Alert, oriented in no acute distress.  Eye contact is good, speech is normal, affect calm  HEENT: Oral mucosa pink moist, no lesions.  Nares with clear mucus.  No tenderness over maxillary or frontal sinuses.  TMs gray with good landmarks bilaterally. No cervical or supraclavicular lymphadenopathy.  Lungs: clear to auscultation bilaterally, normal effort, no wheeze/ rhonchi/ rales.  CV: regular rate and rhythm, S1, S2, no murmur  Ext: no edema, color normal, vascularity normal, temperature normal    Assessment and Plan:   The following treatment plan was discussed   1. Sinus pressure   URI with cough which is now improving, ongoing concerns of sinus pressure and congestion.  I do not see evidence of bacterial infection on exam today and if she proactively manages her congestion she may be able to prevent infection from developing.  She is, however, leaving town in 2 days.  We will have her start with Zyrtec-D, Flonase, continue Mucinex.  If symptoms are not resolving over the next several days she may go ahead and start doxycycline.  She will follow-up if not improving  doxycycline (VIBRAMYCIN) 100 MG Tab   2. Viral URI with cough         Followup: as needed         Please note that this dictation was created using voice recognition software. I have worked with consultants from the vendor as well as technical experts from [facility] to optimize the interface. I have made every reasonable attempt to correct obvious errors, but I expect that there are errors of grammar and possibly content that I did not discover before finalizing the note.       "

## 2019-05-23 ENCOUNTER — OFFICE VISIT (OUTPATIENT)
Dept: MEDICAL GROUP | Facility: MEDICAL CENTER | Age: 59
End: 2019-05-23
Payer: COMMERCIAL

## 2019-05-23 VITALS
SYSTOLIC BLOOD PRESSURE: 102 MMHG | HEIGHT: 69 IN | OXYGEN SATURATION: 99 % | DIASTOLIC BLOOD PRESSURE: 68 MMHG | BODY MASS INDEX: 22.75 KG/M2 | TEMPERATURE: 97.1 F | HEART RATE: 76 BPM | WEIGHT: 153.6 LBS | RESPIRATION RATE: 18 BRPM

## 2019-05-23 DIAGNOSIS — Z13.1 DIABETES MELLITUS SCREENING: ICD-10-CM

## 2019-05-23 DIAGNOSIS — F41.9 ANXIETY: ICD-10-CM

## 2019-05-23 DIAGNOSIS — J30.1 CHRONIC SEASONAL ALLERGIC RHINITIS DUE TO POLLEN: ICD-10-CM

## 2019-05-23 DIAGNOSIS — K21.00 GASTROESOPHAGEAL REFLUX DISEASE WITH ESOPHAGITIS: ICD-10-CM

## 2019-05-23 DIAGNOSIS — Z00.00 ANNUAL PHYSICAL EXAM: Primary | ICD-10-CM

## 2019-05-23 DIAGNOSIS — E78.00 PURE HYPERCHOLESTEROLEMIA: ICD-10-CM

## 2019-05-23 PROBLEM — J06.9 VIRAL URI WITH COUGH: Status: RESOLVED | Noted: 2019-03-07 | Resolved: 2019-05-23

## 2019-05-23 PROBLEM — J34.89 SINUS PRESSURE: Status: RESOLVED | Noted: 2019-03-06 | Resolved: 2019-05-23

## 2019-05-23 PROCEDURE — 99214 OFFICE O/P EST MOD 30 MIN: CPT | Mod: 25 | Performed by: FAMILY MEDICINE

## 2019-05-23 PROCEDURE — 99396 PREV VISIT EST AGE 40-64: CPT | Performed by: FAMILY MEDICINE

## 2019-05-23 RX ORDER — TRIAMCINOLONE ACETONIDE 40 MG/ML
40 INJECTION, SUSPENSION INTRA-ARTICULAR; INTRAMUSCULAR ONCE
Status: COMPLETED | OUTPATIENT
Start: 2019-05-23 | End: 2019-05-23

## 2019-05-23 RX ORDER — ALPRAZOLAM 0.5 MG/1
0.25 TABLET ORAL
Qty: 30 TAB | Refills: 0 | Status: SHIPPED | OUTPATIENT
Start: 2019-05-23 | End: 2019-06-22

## 2019-05-23 RX ADMIN — TRIAMCINOLONE ACETONIDE 40 MG: 40 INJECTION, SUSPENSION INTRA-ARTICULAR; INTRAMUSCULAR at 09:37

## 2019-05-23 NOTE — LETTER
[facility]  Zia Iraheta M.D.  [address]  [place] [address]  Fax: [phone]   Authorization for Release/Disclosure of   Protected Health Information   Name: TULIO GUERRIER : 1960 SSN: [id number]   Address: [address] [place] [address] [address] [place] [address] Phone:    [phone] (home)    I authorize the entity listed below to release/disclose the PHI below to:   [facility]/Zia Iraheta M.D. and Zia Iraheta M.D.   Provider or Entity Name: [facility]     Address   City, State, Zip   Phone:      Fax:     Reason for request: continuity of care   Information to be released:    [  ] LAST COLONOSCOPY,  including any PATH REPORT and follow-up  [  ] LAST FIT/COLOGUARD RESULT [  ] LAST DEXA  [x ] LAST MAMMOGRAM  [x] LAST PAP  [  ] LAST LABS [  ] RETINA EXAM REPORT  [  ] IMMUNIZATION RECORDS  [  ] Release all info      [  ] Check here and initial the line next to each item to release ALL health information INCLUDING  _____ Care and treatment for drug and / or alcohol abuse  _____ HIV testing, infection status, or AIDS  _____ Genetic Testing    DATES OF SERVICE OR TIME PERIOD TO BE DISCLOSED: _____________  I understand and acknowledge that:  * This Authorization may be revoked at any time by you in writing, except if your health information has already been used or disclosed.  * Your health information that will be used or disclosed as a result of you signing this authorization could be re-disclosed by the recipient. If this occurs, your re-disclosed health information may no longer be protected by State or Federal laws.  * You may refuse to sign this Authorization. Your refusal will not affect your ability to obtain treatment.  * This Authorization becomes effective upon signing and will  on (date) __________.      If no date is indicated, this Authorization will  one (1) year from the signature date.    Name: Tulio Guerrier    Signature:   Date:      5/23/2019       PLEASE FAX REQUESTED RECORDS BACK TO: [phone]

## 2019-05-23 NOTE — LETTER
[facility]  Zia Iraheta M.D.  [address]  [place] [address]  Fax: [phone]   Authorization for Release/Disclosure of   Protected Health Information   Name: TULIO GUERRIER : 1960 SSN: [id number]   Address: [address] [place] [address] [address] [place] [address] Phone:    [phone] (home)    I authorize the entity listed below to release/disclose the PHI below to:   [facility]/Zia Iraheta M.D. and Zia Iraheta M.D.   Provider or Entity Name:  [facility]   Address   City, [facility]   Phone:      Fax:     Reason for request: continuity of care   Information to be released:    [  ] LAST COLONOSCOPY,  including any PATH REPORT and follow-up  [  ] LAST FIT/COLOGUARD RESULT [  ] LAST DEXA  [  ] LAST MAMMOGRAM  [  ] LAST PAP  [  ] LAST LABS [  ] RETINA EXAM REPORT  [X] IMMUNIZATION RECORDS  [  ] Release all info      [  ] Check here and initial the line next to each item to release ALL health information INCLUDING  _____ Care and treatment for drug and / or alcohol abuse  _____ HIV testing, infection status, or AIDS  _____ Genetic Testing    DATES OF SERVICE OR TIME PERIOD TO BE DISCLOSED: _____________  I understand and acknowledge that:  * This Authorization may be revoked at any time by you in writing, except if your health information has already been used or disclosed.  * Your health information that will be used or disclosed as a result of you signing this authorization could be re-disclosed by the recipient. If this occurs, your re-disclosed health information may no longer be protected by State or Federal laws.  * You may refuse to sign this Authorization. Your refusal will not affect your ability to obtain treatment.  * This Authorization becomes effective upon signing and will  on (date) __________.      If no date is indicated, this Authorization will  one (1) year from the signature date.    Name: Tulio Guerrier    Signature:   Date:          5/23/2019       PLEASE FAX REQUESTED RECORDS BACK TO: [phone]

## 2019-05-23 NOTE — ASSESSMENT & PLAN NOTE
Chronic, stable, well-controlled, taking medication as directed.     Patient takes half a tablet at times, usually once per day if needed, and is taking no more than approximately 2 to 4 tablets/month on average.  However, she does state that some months can be worse than others.     ROS is NEGATIVE for signs or symptoms of panic attack at present time: generalized weakness/fatigue, dizziness, vision/hearing changes, chest pain/pressure, palpitations, dyspnea, tachypnea, intense feelings of dread.

## 2019-05-23 NOTE — ASSESSMENT & PLAN NOTE
Patient and I discussed recent labs (see below; pure hypercholesterolemia, mildly uncontrolled).    Patient and I then discussed necessary dietary changes to make to address dyslipidemia.  Patient is NOT currently taking cholesterol lowering medication, and would like to address this with dietary changes.  Of note, patient states that she recently went on to the keto diet, therefore we discussed that she should probably change her diet back to normal for at least a few months, then to recheck.  Patient verbalized understanding.    ROS is NEGATIVE for dizziness, generalized weakness/fatigue, vision/hearing changes, jaw pain/paresthesias, BUE pain/paresthesias/numbness/weakness, chest pain/pressure, palpitations, dyspnea, BLE edema.    Lab Results   Component Value Date/Time    CHOLSTRLTOT 231 (H) 09/17/2018 07:55 AM     (H) 09/17/2018 07:55 AM    HDL 88 09/17/2018 07:55 AM    TRIGLYCERIDE 80 09/17/2018 07:55 AM

## 2019-05-23 NOTE — ASSESSMENT & PLAN NOTE
This is an acute exacerbation of chronic condition, controlled, patient states that over the last few months as things have been warming up that the flowering plants pollen have been bothering her, causing her to have sinus congestion, headaches, bilateral ear congestion with mild pharyngitis.        Patient is using OTC antihistamines and medications as needed, however this is not helping to control her symptoms.  Therefore, she presents today for a Kenalog shot.

## 2019-05-23 NOTE — PROGRESS NOTES
Subjective:   Chief Complaint/History of Present Illness:  Yanira Guerrier is a 58 y.o. female established who presents today for Annual Medical exam, to review the following medical problems.      The primary encounter diagnosis was Annual physical exam. Diagnoses of Chronic seasonal allergic rhinitis due to pollen, Anxiety, Pure hypercholesterolemia, Gastroesophageal reflux disease with esophagitis, and Diabetes mellitus screening were also pertinent to this visit.    PMH, PSH, Social History, Medications, Allergies, FMH all reviewed as documented:    Chronic seasonal allergic rhinitis due to pollen  This is an acute exacerbation of chronic condition, controlled, patient states that over the last few months as things have been warming up that the flowering plants pollen have been bothering her, causing her to have sinus congestion, headaches, bilateral ear congestion with mild pharyngitis.        Patient is using OTC antihistamines and medications as needed, however this is not helping to control her symptoms.  Therefore, she presents today for a Kenalog shot.    Anxiety  Chronic, stable, well-controlled, taking medication as directed.     Patient takes half a tablet at times, usually once per day if needed, and is taking no more than approximately 2 to 4 tablets/month on average.  However, she does state that some months can be worse than others.     ROS is NEGATIVE for signs or symptoms of panic attack at present time: generalized weakness/fatigue, dizziness, vision/hearing changes, chest pain/pressure, palpitations, dyspnea, tachypnea, intense feelings of dread.    Pure hypercholesterolemia  Patient and I discussed recent labs (see below; pure hypercholesterolemia, mildly uncontrolled).    Patient and I then discussed necessary dietary changes to make to address dyslipidemia.  Patient is NOT currently taking cholesterol lowering medication, and would like to address this with dietary changes.  Of note,  patient states that she recently went on to the keto diet, therefore we discussed that she should probably change her diet back to normal for at least a few months, then to recheck.  Patient verbalized understanding.    ROS is NEGATIVE for dizziness, generalized weakness/fatigue, vision/hearing changes, jaw pain/paresthesias, BUE pain/paresthesias/numbness/weakness, chest pain/pressure, palpitations, dyspnea, BLE edema.    Lab Results   Component Value Date/Time    CHOLSTRLTOT 231 (H) 09/17/2018 07:55 AM     (H) 09/17/2018 07:55 AM    HDL 88 09/17/2018 07:55 AM    TRIGLYCERIDE 80 09/17/2018 07:55 AM       Gastroesophageal reflux disease with esophagitis  Chronic, stable, well-controlled, taking medication as directed.        Patient Active Problem List    Diagnosis Date Noted   • Family history of mixed hyperlipidemia 11/07/2018   • Gastroesophageal reflux disease with esophagitis 09/05/2018   • Bilateral low back pain 08/06/2018   • Pure hypercholesterolemia 04/23/2018   • Chronic seasonal allergic rhinitis due to pollen 04/23/2018   • Anxiety 04/23/2018       Additional History:   Allergies:    Patient has no known allergies.     Medications:     Current Outpatient Prescriptions Ordered in [facility]   Medication Sig Dispense Refill   • ALPRAZolam (XANAX) 0.5 MG Tab Take 0.5 Tabs by mouth 1 time daily as needed for Sleep or Anxiety for up to 30 days. 30 Tab 0   • omeprazole (PRILOSEC) 20 MG delayed-release capsule TAKE 2 CAPSULE BY ORAL ROUTE EVERY DAY 30 MINUTES TO 1 HOUR BEFORE A MEAL 180 Cap 2   • Omega-3 Fatty Acids (FISH OIL TRIPLE STRENGTH) 1400 MG Cap Take 2,800 mg by mouth every day.     • Plant Sterols and Stanols 450 MG Cap Take  by mouth.     • Probiotic Product (PROBIOMAX DAILY DF) Cap Take  by mouth.     • Calcium-Magnesium-Vitamin D (CALCIUM 500 PO) Take  by mouth every day.     • Multiple Vitamin (MULTI VITAMIN DAILY PO) Take 1 Tab by mouth every day.     • Psyllium 0.52 GM Cap Take  by mouth. 540  "Cap      Current Facility-Administered Medications Ordered in Epic   Medication Dose Route Frequency Provider Last Rate Last Dose   • triamcinolone acetonide (KENALOG-40) injection 40 mg  40 mg Intramuscular Once Zia Iraheta M.D.            Past Medical History:     Past Medical History:   Diagnosis Date   • Heart burn    • Hiatus hernia syndrome    • Indigestion    • Pain 2016    from gallbladder        Past Surgical History:     Past Surgical History:   Procedure Laterality Date   • FAWN BY LAPAROSCOPY N/A 2016    Procedure: FAWN BY LAPAROSCOPY;  Surgeon: Clifton Vinson M.D.;  Location: SURGERY [facility] [place];  Service:    • GYN SURGERY      hysterectomy   • OTHER      tonsillectomy as a child        Social History:     Social History   Substance Use Topics   • Smoking status: Never Smoker   • Smokeless tobacco: Never Used   • Alcohol use 0.6 - 1.2 oz/week     1 - 2 Shots of liquor per week        Family History:     Family Status   Relation Status   • Mo Alive   • Fa    • Sis Alive   • Bro Alive   • PAunt (Not Specified)   • PUnc (Not Specified)   • Bro Alive   • Neg Hx (Not Specified)        Family History   Problem Relation Age of Onset   • Cancer Mother         Basal Cell Carcinoma   • Other Mother         CLL?   • Lung Disease Father         Interstitial Lung Disease   • No Known Problems Sister    • No Known Problems Brother    • Cancer Paternal Aunt         Uterine CA   • Cancer Paternal Uncle         Throat CA d/t smoking   • Alcohol/Drug Paternal Uncle         Smoking   • No Known Problems Brother    • Stroke Neg Hx    • Heart Attack Neg Hx        ROS:     - NOTE: All other systems reviewed and are negative, except as in HPI.     Objective:   Physical Exam:    Vitals: /68 (BP Location: Left arm, Patient Position: Sitting, BP Cuff Size: Adult)   Pulse 76   Temp 36.2 °C (97.1 °F) (Temporal)   Resp 18   Ht 1.753 m (5' 9.02\")   Wt 69.7 kg (153 lb 9.6 oz)   SpO2 99% "    BMI: Body mass index is 22.67 kg/m².   General/Constitutional: Vitals as above, Well nourished, well developed female in no acute distress   Head/Eyes: Head is grossly normal & atraumatic, bilateral conjunctivae clear and not injected, bilateral EOMI, bilateral PERRL   ENT: Bilateral external ears grossly normal in appearance, Hearing grossly intact, External nares normal in appearance and without discharge/bleeding, bilateral tympanic membranes with appropriate cone of light reflex   Respiratory: No respiratory distress, bilateral lungs are clear to ausculation in all lung fields (anterior/lateral/posterior), no wheezing/rhonchi/rales   Cardiovascular: Regular rate and rhythm without murmur/gallops/rubs, distal pulses are intact and equal bilaterally (radial, posterior tibial), no bilateral lower extremity edema   MSK: No paraspinal muscular tenderness to palpation of entire back, No pain on gentle percussion of spinous processes of entire back, Gait grossly normal & not antalgic   Integumentary: No apparent rashes   Psych: Judgment grossly appropriate, no apparent depression/anxiety    Health Maintenance:     - Pap smear and immunization records will be requested from outside facilities ([facility], [facility], respectively)    - Other health maintenance topics were addressed, and found to be up-to-date    - 10/31/18 -- Mammo WNL --> will order next mammo-along with next labs after results of current labs    - NarxCheck is appropriate    Imaging/Labs:     - Labs reviewed and interpreted, as above    - UDS will be obtained at next visit    Assessment and Plan:   1. Annual physical exam  Labs ordered for annual medical exam   - HEMOGLOBIN A1C; Future   - Comp Metabolic Panel; Future   - Lipid Profile; Future   - CRP HIGH SENSITIVE (CARDIAC); Future   - LIPOPROTEIN A; Future   - HOMOCYSTEINE; Future    2. Chronic seasonal allergic rhinitis due to pollen  Acute exacerbation chronic condition,  uncontrolled, Kenalog shot as below   - triamcinolone acetonide (KENALOG-40) injection 40 mg; 1 mL by Intramuscular route Once.    3. Anxiety  Chronic, stable, well-controlled.  Continue taking medication as directed.    - ALPRAZolam (XANAX) 0.5 MG Tab; Take 0.5 Tabs by mouth 1 time daily as needed for Sleep or Anxiety for up to 30 days.  Dispense: 30 Tab; Refill: 0   - Comp Metabolic Panel; Future    4. Pure hypercholesterolemia  Chronic, uncontrolled, patient advised to pursue lifestyle changes, particularly cardiovascular exercise and increasing proportion of plant-based nutrition.  Patient to stop keto diet now   - Lipid Profile; Future   - CRP HIGH SENSITIVE (CARDIAC); Future   - LIPOPROTEIN A; Future   - HOMOCYSTEINE; Future    5. Gastroesophageal reflux disease with esophagitis  Chronic, stable, well-controlled.  Continue taking medication as directed.    - Comp Metabolic Panel; Future    6. Diabetes mellitus screening   - HEMOGLOBIN A1C; Future      RTC: In 6 months for general checkup and cholesterol management.    PLEASE NOTE: This dictation was created using voice recognition software. I have made every reasonable attempt to correct obvious errors, but I expect that there are errors of grammar and possibly content that I did not discover before finalizing the note.

## 2019-06-02 PROBLEM — Z90.710 H/O: HYSTERECTOMY: Status: ACTIVE | Noted: 2019-06-02

## 2019-06-10 ENCOUNTER — TELEPHONE (OUTPATIENT)
Dept: MEDICAL GROUP | Facility: MEDICAL CENTER | Age: 59
End: 2019-06-10

## 2019-06-10 NOTE — TELEPHONE ENCOUNTER
Caller: Yanira Guerrier    Phone: [phone] (home)     Message:  Pt called asking for a UA to be added to her lab orders   States she has not had one done in awhile and would just like to make sure everything is ok     PLEASE ADVISE

## 2019-06-11 NOTE — TELEPHONE ENCOUNTER
Does she have any urinary symptoms?  If not, then let her know that we can discuss her urinary health and concerns at our next visit.

## 2019-06-11 NOTE — TELEPHONE ENCOUNTER
Phone Number Called: [phone] (home)      Call outcome: spoke to patient regarding message below    Message: Patient stated she has bubbles in her urine. Also that its dark. I told that usually that will not be covered by insurance but she still wanted to what you would say.

## 2019-06-14 ENCOUNTER — OFFICE VISIT (OUTPATIENT)
Dept: URGENT CARE | Facility: CLINIC | Age: 59
End: 2019-06-14
Payer: COMMERCIAL

## 2019-06-14 VITALS
HEART RATE: 64 BPM | OXYGEN SATURATION: 99 % | WEIGHT: 150 LBS | SYSTOLIC BLOOD PRESSURE: 112 MMHG | DIASTOLIC BLOOD PRESSURE: 68 MMHG | RESPIRATION RATE: 12 BRPM | HEIGHT: 69 IN | TEMPERATURE: 99.8 F | BODY MASS INDEX: 22.22 KG/M2

## 2019-06-14 DIAGNOSIS — R82.998 FROTHY URINE: ICD-10-CM

## 2019-06-14 LAB
APPEARANCE UR: CLEAR
BILIRUB UR STRIP-MCNC: NORMAL MG/DL
COLOR UR AUTO: NORMAL
GLUCOSE UR STRIP.AUTO-MCNC: NORMAL MG/DL
KETONES UR STRIP.AUTO-MCNC: NORMAL MG/DL
LEUKOCYTE ESTERASE UR QL STRIP.AUTO: NORMAL
NITRITE UR QL STRIP.AUTO: NORMAL
PH UR STRIP.AUTO: 5 [PH] (ref 5–8)
PROT UR QL STRIP: NORMAL MG/DL
RBC UR QL AUTO: NORMAL
SP GR UR STRIP.AUTO: <=1.005
UROBILINOGEN UR STRIP-MCNC: 0.2 MG/DL

## 2019-06-14 PROCEDURE — 81002 URINALYSIS NONAUTO W/O SCOPE: CPT | Performed by: NURSE PRACTITIONER

## 2019-06-14 PROCEDURE — 99212 OFFICE O/P EST SF 10 MIN: CPT | Performed by: NURSE PRACTITIONER

## 2019-06-14 ASSESSMENT — ENCOUNTER SYMPTOMS
NAUSEA: 0
SHORTNESS OF BREATH: 0
FEVER: 0
RESPIRATORY NEGATIVE: 1
VOMITING: 0
DIARRHEA: 0
BACK PAIN: 1
DIZZINESS: 0
ABDOMINAL PAIN: 0
WHEEZING: 0
CHILLS: 0
CARDIOVASCULAR NEGATIVE: 1
NECK PAIN: 0
MYALGIAS: 0
FLANK PAIN: 0
HEADACHES: 0
NEUROLOGICAL NEGATIVE: 1
CONSTIPATION: 0
EYES NEGATIVE: 1

## 2019-06-14 ASSESSMENT — PAIN SCALES - GENERAL: PAINLEVEL: 3=SLIGHT PAIN

## 2019-06-14 NOTE — TELEPHONE ENCOUNTER
Patient needs to be scheduled with me or be seen in urgent care.  That sounds like it could be a UTI.

## 2019-06-14 NOTE — PROGRESS NOTES
Subjective:   Yanira Guerrier is a 58 y.o. female who presents for Dysuria (x1 week) and Flank Pain        HPI   Patient with new onset bubbles in her urine that she noticed a week ago. States with lower back pain, but states has chronically. Denies fever, chills, nausea or vomiting. Denies vaginal odor or discharge. Has not tried anything for relief. States recently stopped keto diet approximately 1 week ago. States she has had some abdominal pain with the keto diet as well, but has resolved since stopping.    Review of Systems   Constitutional: Negative for chills and fever.   Eyes: Negative.    Respiratory: Negative.  Negative for shortness of breath and wheezing.    Cardiovascular: Negative.  Negative for chest pain.   Gastrointestinal: Negative for abdominal pain, constipation, diarrhea, nausea and vomiting.   Genitourinary: Negative for dysuria, flank pain, frequency, hematuria and urgency.   Musculoskeletal: Positive for back pain. Negative for myalgias and neck pain.   Skin: Negative.    Neurological: Negative.  Negative for dizziness and headaches.     PMH:  has a past medical history of Heart burn; Hiatus hernia syndrome; Indigestion; and Pain (2/2016). She also has no past medical history of Anesthesia.  MEDS:   Current Outpatient Prescriptions:   •  omeprazole (PRILOSEC) 20 MG delayed-release capsule, TAKE 2 CAPSULE BY ORAL ROUTE EVERY DAY 30 MINUTES TO 1 HOUR BEFORE A MEAL, Disp: 180 Cap, Rfl: 2  •  Omega-3 Fatty Acids (FISH OIL TRIPLE STRENGTH) 1400 MG Cap, Take 2,800 mg by mouth every day., Disp: , Rfl:   •  Plant Sterols and Stanols 450 MG Cap, Take  by mouth., Disp: , Rfl:   •  Probiotic Product (PROBIOMAX DAILY DF) Cap, Take  by mouth., Disp: , Rfl:   •  Psyllium 0.52 GM Cap, Take  by mouth., Disp: 540 Cap, Rfl:   •  Calcium-Magnesium-Vitamin D (CALCIUM 500 PO), Take  by mouth every day., Disp: , Rfl:   •  Multiple Vitamin (MULTI VITAMIN DAILY PO), Take 1 Tab by mouth every day., Disp: , Rfl:  "  •  ALPRAZolam (XANAX) 0.5 MG Tab, Take 0.5 Tabs by mouth 1 time daily as needed for Sleep or Anxiety for up to 30 days., Disp: 30 Tab, Rfl: 0  ALLERGIES: No Known Allergies  SURGHX:   Past Surgical History:   Procedure Laterality Date   • FAWN BY LAPAROSCOPY N/A 2/16/2016    Procedure: FAWN BY LAPAROSCOPY;  Surgeon: Clifton Vinson M.D.;  Location: SURGERY [facility];  Service:    • GYN SURGERY  2008    hysterectomy   • OTHER      tonsillectomy as a child     SOCHX:  reports that she has never smoked. She has never used smokeless tobacco. She reports that she drinks about 0.6 - 1.2 oz of alcohol per week . She reports that she does not use drugs.  FH: Family history was reviewed, no pertinent findings to report     Objective:   /68   Pulse 64   Temp 37.7 °C (99.8 °F) (Temporal)   Resp 12   Ht 1.753 m (5' 9\")   Wt 68 kg (150 lb)   LMP 02/09/2008 (Approximate)   SpO2 99%   BMI 22.15 kg/m²   Physical Exam   Constitutional: She is oriented to person, place, and time. She appears well-developed and well-nourished. No distress.   HENT:   Right Ear: Hearing normal.   Left Ear: Hearing normal.   Mouth/Throat: Oropharynx is clear and moist and mucous membranes are normal.   Eyes: Pupils are equal, round, and reactive to light. Conjunctivae are normal.   Cardiovascular: Normal rate, regular rhythm and normal heart sounds.    No murmur heard.  Pulmonary/Chest: Effort normal and breath sounds normal. No respiratory distress.   Abdominal: Soft. Normal appearance and bowel sounds are normal. She exhibits no distension. There is no hepatosplenomegaly. There is no tenderness. There is no rigidity, no rebound, no guarding and no CVA tenderness.   Neurological: She is alert and oriented to person, place, and time.   Skin: Skin is warm and dry. Capillary refill takes less than 2 seconds. She is not diaphoretic.   Psychiatric: She has a normal mood and affect. Her behavior is normal. Judgment and thought content " normal.   Vitals reviewed.        Assessment/Plan:   Assessment    1. Frothy urine  - POCT Urinalysis    UA negative. We discussed that dietary changes could be contributing to bubbles in urine. I offered vaginal swabs, but patient declines at this time. We discussed possibility of low estrogen in the vagina, which patient seems to think this is the cause. She states she would like to follow up with GYN regarding this.    Differential diagnosis, natural history, supportive care, and indications for immediate follow-up discussed.

## 2019-08-19 LAB
ALBUMIN SERPL-MCNC: 4.7 G/DL (ref 3.5–5.5)
ALBUMIN/GLOB SERPL: 2.1 {RATIO} (ref 1.2–2.2)
ALP SERPL-CCNC: 63 IU/L (ref 39–117)
ALT SERPL-CCNC: 11 IU/L (ref 0–32)
AST SERPL-CCNC: 19 IU/L (ref 0–40)
BILIRUB SERPL-MCNC: 1.1 MG/DL (ref 0–1.2)
BUN SERPL-MCNC: 8 MG/DL (ref 6–24)
BUN/CREAT SERPL: 10 (ref 9–23)
CALCIUM SERPL-MCNC: 9.4 MG/DL (ref 8.7–10.2)
CHLORIDE SERPL-SCNC: 99 MMOL/L (ref 96–106)
CHOLEST SERPL-MCNC: 259 MG/DL (ref 100–199)
CO2 SERPL-SCNC: 23 MMOL/L (ref 20–29)
CREAT SERPL-MCNC: 0.79 MG/DL (ref 0.57–1)
CRP SERPL HS-MCNC: 1.45 MG/L (ref 0–3)
GLOBULIN SER CALC-MCNC: 2.2 G/DL (ref 1.5–4.5)
GLUCOSE SERPL-MCNC: 89 MG/DL (ref 65–99)
HBA1C MFR BLD: 5.4 % (ref 4.8–5.6)
HCYS SERPL-SCNC: 6.5 UMOL/L (ref 0–15)
HDLC SERPL-MCNC: 100 MG/DL
LABORATORY COMMENT REPORT: ABNORMAL
LDLC SERPL CALC-MCNC: 145 MG/DL (ref 0–99)
LPA SERPL-SCNC: 21.7 NMOL/L
POTASSIUM SERPL-SCNC: 3.9 MMOL/L (ref 3.5–5.2)
PROT SERPL-MCNC: 6.9 G/DL (ref 6–8.5)
SODIUM SERPL-SCNC: 139 MMOL/L (ref 134–144)
TRIGL SERPL-MCNC: 71 MG/DL (ref 0–149)
VLDLC SERPL CALC-MCNC: 14 MG/DL (ref 5–40)

## 2019-09-06 ENCOUNTER — APPOINTMENT (OUTPATIENT)
Dept: MEDICAL GROUP | Facility: MEDICAL CENTER | Age: 59
End: 2019-09-06
Payer: COMMERCIAL

## 2019-09-09 ENCOUNTER — OFFICE VISIT (OUTPATIENT)
Dept: MEDICAL GROUP | Facility: MEDICAL CENTER | Age: 59
End: 2019-09-09
Payer: COMMERCIAL

## 2019-09-09 VITALS
RESPIRATION RATE: 16 BRPM | HEART RATE: 68 BPM | DIASTOLIC BLOOD PRESSURE: 70 MMHG | TEMPERATURE: 98.6 F | WEIGHT: 150 LBS | HEIGHT: 69 IN | SYSTOLIC BLOOD PRESSURE: 104 MMHG | BODY MASS INDEX: 22.22 KG/M2 | OXYGEN SATURATION: 98 %

## 2019-09-09 DIAGNOSIS — Z91.89 OTHER SPECIFIED PERSONAL RISK FACTORS, NOT ELSEWHERE CLASSIFIED: ICD-10-CM

## 2019-09-09 DIAGNOSIS — K21.00 GASTROESOPHAGEAL REFLUX DISEASE WITH ESOPHAGITIS: ICD-10-CM

## 2019-09-09 DIAGNOSIS — R09.81 NASAL CONGESTION: ICD-10-CM

## 2019-09-09 DIAGNOSIS — J30.1 CHRONIC SEASONAL ALLERGIC RHINITIS DUE TO POLLEN: ICD-10-CM

## 2019-09-09 DIAGNOSIS — E78.00 PURE HYPERCHOLESTEROLEMIA: ICD-10-CM

## 2019-09-09 PROCEDURE — 99214 OFFICE O/P EST MOD 30 MIN: CPT | Performed by: NURSE PRACTITIONER

## 2019-09-09 NOTE — PROGRESS NOTES
Subjective:     Chief Complaint   Patient presents with   • Follow-Up     LABS     Yanira Guerrier is a 58 y.o. female here today to follow up on:    Pure hypercholesterolemia  Component      Latest Ref Rng & Units 8/16/2019           4:51 AM   Cholesterol,Tot      100 - 199 mg/dL 259 (H)   Triglycerides      0 - 149 mg/dL 71   HDL      >39 mg/dL 100   VLDL Cholesterol Calc      5 - 40 mg/dL 14   LDL      0 - 99 mg/dL 145 (H)   Comment:       CANCELED   Patient tells me that she has been tracking and actively working on her cholesterol over the last several years.  She has reduced dairy as well as meat, is eating plenty of vegetables, she exercises regularly.  Her LDL continues to be elevated which is a concern for her.  She states that her mother does have high cholesterol, she has not, however, had any MI or diagnosed CAD.  Father passed away at age 68 from lung disease, also no CAD    Gastroesophageal reflux disease with esophagitis  Managing with omeprazole    Chronic seasonal allergic rhinitis due to pollen  Multiple environmental allergies which tend to be worse in the spring.  She had seen an allergy specialist and was advised to try immunotherapy at one point but she has been reluctant to do so, she does not want to pursue this currently    Nasal congestion  Constant nasal congestion, rhinitis, frequent complaints of sinus pressure or headache.  She feels that there is a structural issue preventing her sinuses from draining properly.  She has not been diagnosed with chronic sinusitis in the past.  Symptoms are somewhat better right now so she has backed off on her allergy management.  She would, however, like referral to an ENT as she continues to feel that her nasal passages are constricted       Current medicines (including changes today)  Current Outpatient Medications   Medication Sig Dispense Refill   • omeprazole (PRILOSEC) 20 MG delayed-release capsule TAKE 2 CAPSULE BY ORAL ROUTE EVERY DAY 30  "MINUTES TO 1 HOUR BEFORE A MEAL 180 Cap 2   • Omega-3 Fatty Acids (FISH OIL TRIPLE STRENGTH) 1400 MG Cap Take 2,800 mg by mouth every day.     • Plant Sterols and Stanols 450 MG Cap Take  by mouth.     • Probiotic Product (PROBIOMAX DAILY DF) Cap Take  by mouth.     • Psyllium 0.52 GM Cap Take  by mouth. 540 Cap    • Calcium-Magnesium-Vitamin D (CALCIUM 500 PO) Take  by mouth every day.     • Multiple Vitamin (MULTI VITAMIN DAILY PO) Take 1 Tab by mouth every day.       No current facility-administered medications for this visit.      She  has a past medical history of Heart burn, Hiatus hernia syndrome, Indigestion, and Pain (2/2016). She also has no past medical history of Anesthesia.    ROS included above     Objective:     /70 (BP Location: Left arm, Patient Position: Sitting, BP Cuff Size: Adult)   Pulse 68   Temp 37 °C (98.6 °F) (Temporal)   Resp 16   Ht 1.753 m (5' 9\")   Wt 68 kg (150 lb)   SpO2 98%  Body mass index is 22.15 kg/m².     Physical Exam:  General: Alert, oriented in no acute distress.  Eye contact is good, speech is normal, affect calm  HEENT: Oral mucosa pink moist, no lesions.  Nares clear, no polyps visible.  No pain over maxillary or frontal sinuses.  TMs gray with good landmarks bilaterally. No lymphadenopathy.  Lungs: clear to auscultation bilaterally, normal effort, no wheeze/ rhonchi/ rales.  CV: regular rate and rhythm, S1, S2, no murmur  Ext: no edema, color normal, vascularity normal, temperature normal    Assessment and Plan:   The following treatment plan was discussed   1. Other specified personal risk factors, not elsewhere classified   lipid panel and family history discussed.  She is following a healthy diet and exercising regularly.  We will obtain CT calcium scoring, I will follow-up with her with this result  CT-CARDIAC SCORING   2. Pure hypercholesterolemia  CT-CARDIAC SCORING   3. Gastroesophageal reflux disease with esophagitis   stable   4. Nasal congestion   " patient feels that her nasal passages are constricted, do not drain well.  She has no history of chronic sinusitis, no current sinus pressure or headache  REFERRAL TO ENT   5. Chronic seasonal allergic rhinitis due to pollen   immunotherapy has been discussed, she has been reluctant to pursue this.       Followup: Pending test         Please note that this dictation was created using voice recognition software. I have worked with consultants from the vendor as well as technical experts from [employer] to optimize the interface. I have made every reasonable attempt to correct obvious errors, but I expect that there are errors of grammar and possibly content that I did not discover before finalizing the note.

## 2019-09-09 NOTE — ASSESSMENT & PLAN NOTE
Component      Latest Ref Rng & Units 8/16/2019           4:51 AM   Cholesterol,Tot      100 - 199 mg/dL 259 (H)   Triglycerides      0 - 149 mg/dL 71   HDL      >39 mg/dL 100   VLDL Cholesterol Calc      5 - 40 mg/dL 14   LDL      0 - 99 mg/dL 145 (H)   Comment:       CANCELED   Patient tells me that she has been tracking and actively working on her cholesterol over the last several years.  She has reduced dairy as well as meat, is eating plenty of vegetables, she exercises regularly.  Her LDL continues to be elevated which is a concern for her.  She states that her mother does have high cholesterol, she has not, however, had any MI or diagnosed CAD.  Father passed away at age 68 from lung disease, also no CAD

## 2019-09-09 NOTE — ASSESSMENT & PLAN NOTE
Multiple environmental allergies which tend to be worse in the spring.  She had seen an allergy specialist and was advised to try immunotherapy at one point but she has been reluctant to do so, she does not want to pursue this currently

## 2019-09-19 ENCOUNTER — HOSPITAL ENCOUNTER (OUTPATIENT)
Dept: RADIOLOGY | Facility: MEDICAL CENTER | Age: 59
End: 2019-09-19
Attending: NURSE PRACTITIONER
Payer: COMMERCIAL

## 2019-09-19 DIAGNOSIS — E78.00 PURE HYPERCHOLESTEROLEMIA: ICD-10-CM

## 2019-09-19 DIAGNOSIS — Z91.89 OTHER SPECIFIED PERSONAL RISK FACTORS, NOT ELSEWHERE CLASSIFIED: ICD-10-CM

## 2019-09-19 PROCEDURE — 4410556 CT-CARDIAC SCORING

## 2019-09-27 ENCOUNTER — APPOINTMENT (OUTPATIENT)
Dept: MEDICAL GROUP | Facility: MEDICAL CENTER | Age: 59
End: 2019-09-27
Payer: COMMERCIAL

## 2019-11-04 ENCOUNTER — HOSPITAL ENCOUNTER (OUTPATIENT)
Dept: HOSPITAL 8 - CFH | Age: 59
Discharge: HOME | End: 2019-11-04
Attending: OBSTETRICS & GYNECOLOGY
Payer: COMMERCIAL

## 2019-11-04 DIAGNOSIS — Z12.31: Primary | ICD-10-CM

## 2019-11-04 DIAGNOSIS — N64.89: ICD-10-CM

## 2019-11-24 ENCOUNTER — HOSPITAL ENCOUNTER (OUTPATIENT)
Dept: RADIOLOGY | Facility: MEDICAL CENTER | Age: 59
End: 2019-11-24
Attending: INTERNAL MEDICINE
Payer: COMMERCIAL

## 2019-11-24 DIAGNOSIS — K21.9 GASTROESOPHAGEAL REFLUX DISEASE, ESOPHAGITIS PRESENCE NOT SPECIFIED: ICD-10-CM

## 2019-11-24 DIAGNOSIS — R10.13 ABDOMINAL PAIN, EPIGASTRIC: ICD-10-CM

## 2019-11-24 PROCEDURE — 76705 ECHO EXAM OF ABDOMEN: CPT

## 2019-12-06 ENCOUNTER — OFFICE VISIT (OUTPATIENT)
Dept: MEDICAL GROUP | Facility: MEDICAL CENTER | Age: 59
End: 2019-12-06
Payer: COMMERCIAL

## 2019-12-06 VITALS
OXYGEN SATURATION: 99 % | TEMPERATURE: 97 F | BODY MASS INDEX: 21.62 KG/M2 | WEIGHT: 146 LBS | DIASTOLIC BLOOD PRESSURE: 70 MMHG | SYSTOLIC BLOOD PRESSURE: 104 MMHG | HEIGHT: 69 IN | HEART RATE: 62 BPM | RESPIRATION RATE: 16 BRPM

## 2019-12-06 DIAGNOSIS — K21.00 GASTROESOPHAGEAL REFLUX DISEASE WITH ESOPHAGITIS: ICD-10-CM

## 2019-12-06 DIAGNOSIS — E78.00 PURE HYPERCHOLESTEROLEMIA: ICD-10-CM

## 2019-12-06 DIAGNOSIS — Z23 NEED FOR INFLUENZA VACCINATION: ICD-10-CM

## 2019-12-06 DIAGNOSIS — Z12.39 SCREENING FOR BREAST CANCER: ICD-10-CM

## 2019-12-06 DIAGNOSIS — J30.1 CHRONIC SEASONAL ALLERGIC RHINITIS DUE TO POLLEN: ICD-10-CM

## 2019-12-06 DIAGNOSIS — Z23 NEED FOR VACCINATION: ICD-10-CM

## 2019-12-06 DIAGNOSIS — R93.1 AGATSTON CORONARY ARTERY CALCIUM SCORE LESS THAN 100: ICD-10-CM

## 2019-12-06 PROCEDURE — 90471 IMMUNIZATION ADMIN: CPT | Performed by: NURSE PRACTITIONER

## 2019-12-06 PROCEDURE — 99214 OFFICE O/P EST MOD 30 MIN: CPT | Mod: 25 | Performed by: NURSE PRACTITIONER

## 2019-12-06 PROCEDURE — 90472 IMMUNIZATION ADMIN EACH ADD: CPT | Performed by: NURSE PRACTITIONER

## 2019-12-06 PROCEDURE — 90715 TDAP VACCINE 7 YRS/> IM: CPT | Performed by: NURSE PRACTITIONER

## 2019-12-06 PROCEDURE — 90686 IIV4 VACC NO PRSV 0.5 ML IM: CPT | Performed by: NURSE PRACTITIONER

## 2019-12-06 RX ORDER — PANTOPRAZOLE SODIUM 40 MG/1
40 TABLET, DELAYED RELEASE ORAL DAILY
COMMUNITY
End: 2021-09-29

## 2019-12-06 NOTE — LETTER
[facility]  RUSTAM CardRSYD.  [address]  [place] [address]  Fax: [phone]   Authorization for Release/Disclosure of   Protected Health Information   Name: TULIO GUERRIER : 1960 SSN: [id number]   Address: [address] [place] [address] [address] [place] [address] Phone:    [phone] (home)    I authorize the entity listed below to release/disclose the PHI below to:   [facility]/RUSTAM CardRMACIEJ and CHRISTINE Card   Provider or Entity Name:  [facility]   City, [facility], [facility]   Phone:      Fax:     Reason for request: continuity of care   Information to be released:    [  ] LAST COLONOSCOPY,  including any PATH REPORT and follow-up  [  ] LAST FIT/COLOGUARD RESULT [  ] LAST DEXA  [ x ] LAST MAMMOGRAM  [  ] LAST PAP  [  ] LAST LABS [  ] RETINA EXAM REPORT  [  ] IMMUNIZATION RECORDS  [  ] Release all info      [  ] Check here and initial the line next to each item to release ALL health information INCLUDING  _____ Care and treatment for drug and / or alcohol abuse  _____ HIV testing, infection status, or AIDS  _____ Genetic Testing    DATES OF SERVICE OR TIME PERIOD TO BE DISCLOSED: _____________  I understand and acknowledge that:  * This Authorization may be revoked at any time by you in writing, except if your health information has already been used or disclosed.  * Your health information that will be used or disclosed as a result of you signing this authorization could be re-disclosed by the recipient. If this occurs, your re-disclosed health information may no longer be protected by State or Federal laws.  * You may refuse to sign this Authorization. Your refusal will not affect your ability to obtain treatment.  * This Authorization becomes effective upon signing and will  on (date) __________.      If no date is indicated, this Authorization will  one (1) year from the signature date.    Name: Tulio Guerrier    Signature:   Date:          12/6/2019       PLEASE FAX REQUESTED RECORDS BACK TO: [phone]

## 2019-12-06 NOTE — PROGRESS NOTES
Subjective:     Chief Complaint   Patient presents with   • Establish Care   • Immunizations     FLU VACCINE    • GI Problem     Yanira Guerrier is a 59 y.o. female here today to transfer care from Dr. Iraheta who is no longer with the medical group.  We discussed:    Pure hypercholesterolemia  Last    Pt has completed CT for calcium scoring which looked excellent.  She is working on healthy diet and exercising regularly  FINDINGS:     Coronary calcification:  LMA - 0.0  LCX - 0.0  LAD - 0.0  RCA - 0.0  PDA - 0.0     Total Calcium Score: 0.0    Chronic seasonal allergic rhinitis due to pollen  Worse issues with persistent congestion and rhinitis over the last few years.  She has been seen by ENT, has been told that her sinus passages are restricted.  There was some discussion of sinus surgery but she is reluctant to pursue this.  She has not been consistent in antihistamine use, states that she occasionally takes Claritin.  Also occasionally using Flonase but not regularly.  States that she has tried [place] pot but feels that this causes headaches.  She has received Kenalog in the past but this is become less effective the last time that she was given an injection.  Considering immunotherapy but has not started.     Gastroesophageal reflux disease with esophagitis  [facility]. Recent EGD showing gastritis, still awaiting biopsy. Pantoprazole has been increased to 40 mg daily which she feels is helping. No nausea, dysphagia. Recent RUQ ultrasound also normal. H/o cholecystecomy       Current medicines (including changes today)  Current Outpatient Medications   Medication Sig Dispense Refill   • pantoprazole (PROTONIX) 40 MG Tablet Delayed Response Take 40 mg by mouth every day.     • Omega-3 Fatty Acids (FISH OIL TRIPLE STRENGTH) 1400 MG Cap Take 2,800 mg by mouth every day.     • Plant Sterols and Stanols 450 MG Cap Take  by mouth.     • Probiotic Product (PROBIOMAX DAILY DF)  "Cap Take  by mouth.     • Psyllium 0.52 GM Cap Take  by mouth. 540 Cap    • Calcium-Magnesium-Vitamin D (CALCIUM 500 PO) Take  by mouth every day.     • Multiple Vitamin (MULTI VITAMIN DAILY PO) Take 1 Tab by mouth every day.       No current facility-administered medications for this visit.      She  has a past medical history of Heart burn, Hiatus hernia syndrome, Indigestion, and Pain (2/2016). She also has no past medical history of Anesthesia.    ROS included above     Objective:     /70 (BP Location: Left arm, Patient Position: Sitting, BP Cuff Size: Adult)   Pulse 62   Temp 36.1 °C (97 °F) (Temporal)   Resp 16   Ht 1.753 m (5' 9\")   Wt 66.2 kg (146 lb)   SpO2 99%  Body mass index is 21.56 kg/m².     Physical Exam:  General: Alert, oriented in no acute distress.  Eye contact is good, speech is normal, affect calm  Lungs: clear to auscultation bilaterally, normal effort, no wheeze/ rhonchi/ rales.  CV: regular rate and rhythm, S1, S2, no murmur  Ext: no edema, color normal, vascularity normal, temperature normal    Assessment and Plan:   The following treatment plan was discussed   1. Pure hypercholesterolemia   increasing LDL over the last several years.  Working on healthy diet and regular exercise.  We have reviewed her calcium scoring today which looks excellent, I would not recommend statin therapy at this point.  Continue with lifestyle efforts   2. Agatston coronary artery calcium score less than 100     3. Chronic seasonal allergic rhinitis due to pollen   uncontrolled although she has not been consistent with allergy management.  Encouraged consistent antihistamine, Flonase, may try NeilMed.  Risks of Kenalog injection reviewed including increased risk for osteoporosis and infection.   4. Gastroesophageal reflux disease with esophagitis   recent EGD, awaiting biopsy results.  Some improvement with higher dose of pantoprazole, she will continue follow-up with GI   5. Screening for breast " cancer   mammogram recently completed, record requested   6. Need for influenza vaccination  I have placed the below orders and discussed them with an approved delegating provider. The MA is performing the below orders under the direction of Dr. Julian  Influenza Vaccine Quad Injection (PF)   7. Need for vaccination  Tdap =>6yo IM       Followup: annually, sooner as needed  Reading         Please note that this dictation was created using voice recognition software. I have worked with consultants from the vendor as well as technical experts from [employer] to optimize the interface. I have made every reasonable attempt to correct obvious errors, but I expect that there are errors of grammar and possibly content that I did not discover before finalizing the note.

## 2019-12-06 NOTE — ASSESSMENT & PLAN NOTE
Worse issues with persistent congestion and rhinitis over the last few years.  She has been seen by ENT, has been told that her sinus passages are restricted.  There was some discussion of sinus surgery but she is reluctant to pursue this.  She has not been consistent in antihistamine use, states that she occasionally takes Claritin.  Also occasionally using Flonase but not regularly.  States that she has tried [place] pot but feels that this causes headaches.  She has received Kenalog in the past but this is become less effective the last time that she was given an injection.  Considering immunotherapy but has not started.

## 2019-12-06 NOTE — LETTER
[facility]  RUSTAM CardRKelseyN.  [address]  [place] [address]  Fax: [phone]   Authorization for Release/Disclosure of   Protected Health Information   Name: TULIO GUERRIER : 1960 SSN: [id number]   Address: [address] [place] [address] [address] [place] [address] Phone:    [phone] (home)    I authorize the entity listed below to release/disclose the PHI below to:   [facility].RMACIEJ and CHRISTINE Card   Provider or Entity Name:  Dr. Echavarria   Address   City, State, [facility]   Phone:      Fax:     Reason for request: continuity of care   Information to be released:    [  ] LAST COLONOSCOPY,  including any PATH REPORT and follow-up  [  ] LAST FIT/COLOGUARD RESULT [  ] LAST DEXA  [  ] LAST MAMMOGRAM  [  ] LAST PAP  [  ] LAST LABS [  ] RETINA EXAM REPORT  [  ] IMMUNIZATION RECORDS  [  ] Release all info      [  ] Check here and initial the line next to each item to release ALL health information INCLUDING  _____ Care and treatment for drug and / or alcohol abuse  _____ HIV testing, infection status, or AIDS  _____ Genetic Testing    DATES OF SERVICE OR TIME PERIOD TO BE DISCLOSED: _____________  I understand and acknowledge that:  * This Authorization may be revoked at any time by you in writing, except if your health information has already been used or disclosed.  * Your health information that will be used or disclosed as a result of you signing this authorization could be re-disclosed by the recipient. If this occurs, your re-disclosed health information may no longer be protected by State or Federal laws.  * You may refuse to sign this Authorization. Your refusal will not affect your ability to obtain treatment.  * This Authorization becomes effective upon signing and will  on (date) __________.      If no date is indicated, this Authorization will  one (1) year from the signature date.    Name: Tulio Guerrier    Signature:   Date:      12/6/2019       PLEASE FAX REQUESTED RECORDS BACK TO: [phone]

## 2019-12-06 NOTE — ASSESSMENT & PLAN NOTE
Last    Pt has completed CT for calcium scoring which looked excellent.  She is working on healthy diet and exercising regularly  FINDINGS:     Coronary calcification:  LMA - 0.0  LCX - 0.0  LAD - 0.0  RCA - 0.0  PDA - 0.0     Total Calcium Score: 0.0

## 2019-12-06 NOTE — ASSESSMENT & PLAN NOTE
Followed by [facility] at [facility]. Recent EGD showing gastritis, still awaiting biopsy. Pantoprazole has been increased to 40 mg daily which she feels is helping. No nausea, dysphagia. Recent RUQ ultrasound also normal. H/o cholecystecomy

## 2020-04-13 ENCOUNTER — PATIENT MESSAGE (OUTPATIENT)
Dept: MEDICAL GROUP | Facility: MEDICAL CENTER | Age: 60
End: 2020-04-13

## 2020-04-13 DIAGNOSIS — F41.9 ANXIETY: ICD-10-CM

## 2020-04-13 RX ORDER — ALPRAZOLAM 0.5 MG/1
TABLET ORAL
Qty: 30 TAB | OUTPATIENT
Start: 2020-04-13 | End: 2020-05-13

## 2020-04-14 RX ORDER — ALPRAZOLAM 0.5 MG/1
0.5 TABLET ORAL
Qty: 30 TAB | Refills: 0 | Status: SHIPPED
Start: 2020-04-14 | End: 2020-05-14

## 2020-07-22 ENCOUNTER — TELEMEDICINE (OUTPATIENT)
Dept: MEDICAL GROUP | Facility: MEDICAL CENTER | Age: 60
End: 2020-07-22
Payer: COMMERCIAL

## 2020-07-22 VITALS — WEIGHT: 150 LBS | HEIGHT: 69 IN | BODY MASS INDEX: 22.22 KG/M2

## 2020-07-22 DIAGNOSIS — Z00.00 ANNUAL PHYSICAL EXAM: ICD-10-CM

## 2020-07-22 DIAGNOSIS — K21.00 GASTROESOPHAGEAL REFLUX DISEASE WITH ESOPHAGITIS: ICD-10-CM

## 2020-07-22 DIAGNOSIS — E78.00 PURE HYPERCHOLESTEROLEMIA: ICD-10-CM

## 2020-07-22 DIAGNOSIS — Z13.21 ENCOUNTER FOR VITAMIN DEFICIENCY SCREENING: ICD-10-CM

## 2020-07-22 DIAGNOSIS — Z13.29 THYROID DISORDER SCREEN: ICD-10-CM

## 2020-07-22 DIAGNOSIS — J30.1 CHRONIC SEASONAL ALLERGIC RHINITIS DUE TO POLLEN: ICD-10-CM

## 2020-07-22 DIAGNOSIS — Z13.0 SCREENING FOR DEFICIENCY ANEMIA: ICD-10-CM

## 2020-07-22 PROCEDURE — 99396 PREV VISIT EST AGE 40-64: CPT | Mod: 95,CR | Performed by: NURSE PRACTITIONER

## 2020-07-22 NOTE — ASSESSMENT & PLAN NOTE
Persistent difficulty, currently managing with flonase and OTC antihistamine occasionally which is not fully controlling symptoms.  She still has some restriction in her nasal passages as well as postnasal drainage.  She was seen by an ENT who suggested septal deviation repair and turbinoplasty, she is reluctant to pursue surgery

## 2020-07-22 NOTE — ASSESSMENT & PLAN NOTE
History of elevated cholesterol, completed CT for calcium scoring last year which looked excellent, coronary calcification score of 0.  She is working on diet and regular exercise

## 2020-07-22 NOTE — PROGRESS NOTES
Telemedicine Visit: Established Patient     This encounter was conducted via [employer].   Verbal consent was obtained. Patient's identity was verified.    Subjective:   CC: annual  Yanira Guerrier is a 59 y.o. female presenting for evaluation and management of:  Chronic seasonal allergic rhinitis due to pollen  Persistent difficulty, currently managing with flonase and OTC antihistamine occasionally which is not fully controlling symptoms.  She still has some restriction in her nasal passages as well as postnasal drainage.  She was seen by an ENT who suggested septal deviation repair and turbinoplasty, she is reluctant to pursue surgery    Gastroesophageal reflux disease with esophagitis  Chronic issue managed with pantoprazole 40 mg daily, followed by GI. EGD last fall normal with no sign of Bae's esophagus or EOE.  Symptoms are well controlled at this time and she would like to try reducing her dose.    Pure hypercholesterolemia  History of elevated cholesterol, completed CT for calcium scoring last year which looked excellent, coronary calcification score of 0.  She is working on diet and regular exercise      No Known Allergies    Current medicines (including changes today)  Current Outpatient Medications   Medication Sig Dispense Refill   • pantoprazole (PROTONIX) 40 MG Tablet Delayed Response Take 40 mg by mouth every day.     • Omega-3 Fatty Acids (FISH OIL TRIPLE STRENGTH) 1400 MG Cap Take 2,800 mg by mouth every day.     • Probiotic Product (PROBIOMAX DAILY DF) Cap Take  by mouth.     • Psyllium 0.52 GM Cap Take  by mouth. 540 Cap    • Calcium-Magnesium-Vitamin D (CALCIUM 500 PO) Take  by mouth every day.     • Multiple Vitamin (MULTI VITAMIN DAILY PO) Take 1 Tab by mouth every day.     • Plant Sterols and Stanols 450 MG Cap Take  by mouth.       No current facility-administered medications for this visit.        Patient Active Problem List    Diagnosis Date Noted   • Agatston coronary artery calcium  "score less than 100 12/06/2019   • Nasal congestion 09/09/2019   • H/O hysterectomy 06/02/2019   • Sinus pressure 03/06/2019   • Family history of mixed hyperlipidemia 11/07/2018   • Gastroesophageal reflux disease with esophagitis 09/05/2018   • Bilateral low back pain 08/06/2018   • Pure hypercholesterolemia 04/23/2018   • Chronic seasonal allergic rhinitis due to pollen 04/23/2018   • Anxiety 04/23/2018       Family History   Problem Relation Age of Onset   • Cancer Mother         Basal Cell Carcinoma   • Other Mother         CLL?   • Lung Disease Father         Interstitial Lung Disease   • No Known Problems Sister    • No Known Problems Brother    • Cancer Paternal Aunt         Uterine CA   • Cancer Paternal Uncle         Throat CA d/t smoking   • Alcohol/Drug Paternal Uncle         Smoking   • No Known Problems Brother    • Stroke Neg Hx    • Heart Attack Neg Hx        She  has a past medical history of Heart burn, Hiatus hernia syndrome, Indigestion, and Pain (2/2016). She also has no past medical history of Anesthesia.  She  has a past surgical history that includes gyn surgery (2008); other; and uday by laparoscopy (N/A, 2/16/2016).       Objective:   Ht 1.753 m (5' 9\")   Wt 68 kg (150 lb)   LMP 02/09/2008 (Approximate)   BMI 22.15 kg/m²     Physical Exam:  Constitutional: Alert, no distress, well-groomed.  Skin: No rashes in visible areas.  Eye: Round. Conjunctiva clear, lids normal. No icterus.   ENMT: Lips pink without lesions, good dentition, moist mucous membranes. Phonation normal.  Neck: No masses, no thyromegaly. Moves freely without pain.  CV: Pulse as reported by patient  Respiratory: Unlabored respiratory effort, no cough or audible wheeze  Psych: Alert and oriented x3, normal affect and mood.       Assessment and Plan:   The following treatment plan was discussed:     1. Annual physical exam  Chronic issue stable, preventative health measures reviewed.  2. Chronic seasonal allergic rhinitis " due to pollen  Uncontrolled.  She is reluctant to take daily medication, we have discussed that the antihistamines and Flonase will work better if used consistently.  She may give this a trial.    3. Pure hypercholesterolemia  - Lipid Profile; Future  4. Gastroesophageal reflux disease with esophagitis  Well-controlled, may try reduced dose of pantoprazole to 20 mg daily  5. Encounter for vitamin deficiency screening  - VITAMIN D,25 HYDROXY; Future    6. Screening for deficiency anemia  - CBC WITH DIFFERENTIAL; Future    7. Thyroid disorder screen  - TSH WITH REFLEX TO FT4; Future        Follow-up: pending labs

## 2020-07-22 NOTE — ASSESSMENT & PLAN NOTE
Chronic issue managed with pantoprazole 40 mg daily, followed by GI. EGD last fall normal with no sign of Bae's esophagus or EOE.  Symptoms are well controlled at this time and she would like to try reducing her dose.

## 2020-07-30 NOTE — ASSESSMENT & PLAN NOTE
Constant nasal congestion, rhinitis, frequent complaints of sinus pressure or headache.  She feels that there is a structural issue preventing her sinuses from draining properly.  She has not been diagnosed with chronic sinusitis in the past.  Symptoms are somewhat better right now so she has backed off on her allergy management.  She would, however, like referral to an ENT as she continues to feel that her nasal passages are constricted   no fever and no chills.

## 2020-09-22 ENCOUNTER — OFFICE VISIT (OUTPATIENT)
Dept: MEDICAL GROUP | Age: 60
End: 2020-09-22
Payer: COMMERCIAL

## 2020-09-22 VITALS
HEIGHT: 69 IN | TEMPERATURE: 98.5 F | DIASTOLIC BLOOD PRESSURE: 72 MMHG | WEIGHT: 149.25 LBS | HEART RATE: 74 BPM | OXYGEN SATURATION: 97 % | BODY MASS INDEX: 22.11 KG/M2 | SYSTOLIC BLOOD PRESSURE: 102 MMHG

## 2020-09-22 DIAGNOSIS — E78.00 PURE HYPERCHOLESTEROLEMIA: ICD-10-CM

## 2020-09-22 DIAGNOSIS — Z23 NEED FOR VACCINATION: ICD-10-CM

## 2020-09-22 DIAGNOSIS — F41.9 ANXIETY: ICD-10-CM

## 2020-09-22 DIAGNOSIS — Z00.00 ANNUAL PHYSICAL EXAM: ICD-10-CM

## 2020-09-22 DIAGNOSIS — Z00.00 ENCOUNTER FOR MEDICAL EXAMINATION TO ESTABLISH CARE: ICD-10-CM

## 2020-09-22 DIAGNOSIS — K21.00 GASTROESOPHAGEAL REFLUX DISEASE WITH ESOPHAGITIS: ICD-10-CM

## 2020-09-22 DIAGNOSIS — Z11.59 NEED FOR HEPATITIS C SCREENING TEST: ICD-10-CM

## 2020-09-22 PROCEDURE — 99214 OFFICE O/P EST MOD 30 MIN: CPT | Mod: 25 | Performed by: FAMILY MEDICINE

## 2020-09-22 PROCEDURE — 90686 IIV4 VACC NO PRSV 0.5 ML IM: CPT | Performed by: FAMILY MEDICINE

## 2020-09-22 PROCEDURE — 90471 IMMUNIZATION ADMIN: CPT | Performed by: FAMILY MEDICINE

## 2020-09-22 RX ORDER — PANTOPRAZOLE SODIUM 20 MG/1
20 TABLET, DELAYED RELEASE ORAL DAILY
Qty: 90 TAB | Refills: 2 | Status: SHIPPED | OUTPATIENT
Start: 2020-09-22 | End: 2021-05-13 | Stop reason: SDUPTHER

## 2020-09-22 RX ORDER — ALPRAZOLAM 0.5 MG/1
TABLET ORAL
COMMUNITY
End: 2021-05-24

## 2020-09-22 SDOH — HEALTH STABILITY: MENTAL HEALTH: HOW MANY STANDARD DRINKS CONTAINING ALCOHOL DO YOU HAVE ON A TYPICAL DAY?: 1 OR 2

## 2020-09-22 SDOH — HEALTH STABILITY: MENTAL HEALTH: HOW OFTEN DO YOU HAVE 6 OR MORE DRINKS ON ONE OCCASION?: NEVER

## 2020-09-22 SDOH — HEALTH STABILITY: MENTAL HEALTH: HOW OFTEN DO YOU HAVE A DRINK CONTAINING ALCOHOL?: 2-3 TIMES A WEEK

## 2020-09-22 ASSESSMENT — PATIENT HEALTH QUESTIONNAIRE - PHQ9: CLINICAL INTERPRETATION OF PHQ2 SCORE: 0

## 2020-09-22 NOTE — PROGRESS NOTES
This medical record contains text that has been entered with the assistance of computer voice recognition and dictation software.  Therefore, it may contain unintended errors in text, spelling, punctuation, or grammar      Chief Complaint   Patient presents with   • Establish Care   • Requesting Labs         Yanira Carrizales is a 59 y.o. female here evaluation and management of: establish care      HPI:     1. Encounter for medical examination to establish care  Patient is a 59-year-old female who presents to clinic to establish care.  She has a significant past medical history of GERD, hyperlipidemia, anxiety and she is due for vaccines.    Today the patient denied any chest pain, shortness of breath fevers chills night sweats headaches.    Past medical history see above and below    Family History of Cancer---mother with CLL at age 40 (now 91 y/o)    Family History of CAD---none      Social History        Occupation----works as realtor    Exercise---very active, kayak, paddle board, walks, racqet ball, skies, and snow shoes    Colonoscopy---up to date    Pets---1 dog [place] named Elayne is 9 y/o      2. Pure hypercholesterolemia  Patient has been using lifestyle modification to address this.  She had a 0 coronary artery calcium score      Results for YANIRA CARRIZALES (MRN [id number]) as of 9/22/2020 09:37   Ref. Range 8/16/2019 04:51   Cholesterol,Tot Latest Ref Range: 100 - 199 mg/dL 259 (H)   Triglycerides Latest Ref Range: 0 - 149 mg/dL 71   HDL Latest Ref Range: >39 mg/dL 100   LDL Latest Ref Range: 0 - 99 mg/dL 145 (H)   VLDL Cholesterol Calc Latest Ref Range: 5 - 40 mg/dL 14   Lipoprotein A Latest Ref Range: <75.0 nmol/L 21.7   Comment: Unknown CANCELED         3. Gastroesophageal reflux disease with esophagitis    Patient states that she sees Dr. Davey regularly.  She states she takes pantoprazole now 20 mg daily without it she has severe dyspepsia severe reflux.  She states she has been scoped by  an upper endoscopy which revealed a hiatal hernia.      4. Anxiety  The patient states she will make 30 tabs of Xanax last year.  She only uses it for rare episodes of anxiety or nonstop thinking prior to going to bed.       5. Annual physical exam  Due for annual labs    Current medicines (including changes today)  Current Outpatient Medications   Medication Sig Dispense Refill   • ALPRAZolam (XANAX) 0.5 MG Tab alprazolam 0.5 mg tablet     • pantoprazole (PROTONIX) 20 MG tablet Take 1 Tab by mouth every day. 90 Tab 2   • pantoprazole (PROTONIX) 40 MG Tablet Delayed Response Take 40 mg by mouth every day.     • Omega-3 Fatty Acids (FISH OIL TRIPLE STRENGTH) 1400 MG Cap Take 2,800 mg by mouth every day.     • Probiotic Product (PROBIOMAX DAILY DF) Cap Take  by mouth.     • Psyllium 0.52 GM Cap Take  by mouth. 540 Cap    • Calcium-Magnesium-Vitamin D (CALCIUM 500 PO) Take  by mouth every day.     • Multiple Vitamin (MULTI VITAMIN DAILY PO) Take 1 Tab by mouth every day.     • Plant Sterols and Stanols 450 MG Cap Take  by mouth.       No current facility-administered medications for this visit.      She  has a past medical history of Heart burn, Hiatus hernia syndrome, Indigestion, and Pain (2/2016). She also has no past medical history of Anesthesia.  She  has a past surgical history that includes gyn surgery (2008); other; and uday by laparoscopy (N/A, 2/16/2016).  Social History     Tobacco Use   • Smoking status: Never Smoker   • Smokeless tobacco: Never Used   Substance Use Topics   • Alcohol use: Yes     Alcohol/week: 0.6 - 1.2 oz     Types: 1 - 2 Shots of liquor per week     Frequency: 2-3 times a week     Drinks per session: 1 or 2     Binge frequency: Never   • Drug use: No     Social History     Social History Narrative   • Not on file     Family History   Problem Relation Age of Onset   • Cancer Mother         Basal Cell Carcinoma   • Other Mother         CLL?   • Lung Disease Father         Interstitial  "Lung Disease   • No Known Problems Sister    • No Known Problems Brother    • Cancer Paternal Aunt         Uterine CA   • Cancer Paternal Uncle         Throat CA d/t smoking   • Alcohol/Drug Paternal Uncle         Smoking   • No Known Problems Brother    • Stroke Neg Hx    • Heart Attack Neg Hx      Family Status   Relation Name Status   • Mo  Alive   • Fa     • Sis  Alive   • Bro  Alive   • PAunt  (Not Specified)   • PUnc  (Not Specified)   • Bro  Alive   • Neg Hx  (Not Specified)         ROS    The pertinent  ROS findings can be seen in the HPI above.     All other systems reviewed and are negative     Objective:     /72 (BP Location: Left arm, Patient Position: Sitting, BP Cuff Size: Adult)   Pulse 74   Temp 36.9 °C (98.5 °F) (Temporal)   Ht 1.753 m (5' 9\")   Wt 67.7 kg (149 lb 4 oz)   SpO2 97%  Body mass index is 22.04 kg/m².      Physical Exam:    Constitutional: Alert, no distress.  Skin: no rashes  Eye: Equal, round and reactive, conjunctiva clear, lids normal.  ENMT: Lips without lesions, good dentition, oropharynx clear.  Neck: Trachea midline, no masses, no thyromegaly. No cervical or supraclavicular lymphadenopathy.  Respiratory: Unlabored respiratory effort, lungs clear to auscultation, no wheezes, no ronchi.  Cardiovascular: Normal S1, S2, no murmur, no edema  Abdomen: Soft, non-tender, no masses, no hepatosplenomegaly.        Assessment and Plan:   The following treatment plan was discussed      1. Encounter for medical examination to establish care  Care has been established  We need  baseline labs to establish a clinical profile  We will then consider daily prophylactic aspirin   In order to weigh  the benefits vs risk of GI bleed    Age-appropriate preventive services recommended by USPTF guidelines and ACIP were discussed today.    Requested any outside Medical records to be sent to us  Denies intimate partner viloence  Discussed seat belt safety        2. Pure " hypercholesterolemia  We will obtain new labs to update clinical profile.  Then we will adjust therapy as needed.      3. Gastroesophageal reflux disease with esophagitis    Gerd precautions given (avoid the supine position after eating, avoid tight fitting clothing, head of bed elevation by raisin legs of bed,  avoid eating prior to sleeping, avoid reflux-inducing foods (foods high in fat, chocolate, peppermint, and excessive alcohol, which can decrease LES pressure), promote salivation by chewing gum or lozenges,      4. Anxiety  Patient has been stable with current management  We will make no changes for now      5. Annual physical exam   We will obtain new labs to update clinical profile.  Then we will adjust therapy as needed.    - Comp Metabolic Panel; Future  - CBC WITHOUT DIFFERENTIAL; Future  - Lipid Profile; Future  - TSH+FREE T4  - T3 FREE; Future  - VITAMIN D,25 HYDROXY; Future          Instructed to Follow up in clinic or ER for worsening symptoms, difficulty breathing, lack of expected recovery, or should new symptoms or problems arise.    Followup: Return in about 6 months (around 3/22/2021) for Reevaluation.       Once again this medical record contains text that has been entered with the assistance of computer voice recognition and dictation software.  Therefore, it may contain unintended errors in text, spelling, punctuation, or grammar

## 2020-09-25 LAB
25(OH)D3+25(OH)D2 SERPL-MCNC: 47.3 NG/ML (ref 30–100)
ALBUMIN SERPL-MCNC: 4.8 G/DL (ref 3.8–4.9)
ALBUMIN/GLOB SERPL: 2.2 {RATIO} (ref 1.2–2.2)
ALP SERPL-CCNC: 69 IU/L (ref 39–117)
ALT SERPL-CCNC: 11 IU/L (ref 0–32)
AST SERPL-CCNC: 17 IU/L (ref 0–40)
BILIRUB SERPL-MCNC: 0.8 MG/DL (ref 0–1.2)
BUN SERPL-MCNC: 9 MG/DL (ref 6–24)
BUN/CREAT SERPL: 11 (ref 9–23)
CALCIUM SERPL-MCNC: 9.4 MG/DL (ref 8.7–10.2)
CHLORIDE SERPL-SCNC: 101 MMOL/L (ref 96–106)
CHOLEST SERPL-MCNC: 253 MG/DL (ref 100–199)
CO2 SERPL-SCNC: 25 MMOL/L (ref 20–29)
CREAT SERPL-MCNC: 0.79 MG/DL (ref 0.57–1)
ERYTHROCYTE [DISTWIDTH] IN BLOOD BY AUTOMATED COUNT: 12.8 % (ref 11.7–15.4)
GLOBULIN SER CALC-MCNC: 2.2 G/DL (ref 1.5–4.5)
GLUCOSE SERPL-MCNC: 97 MG/DL (ref 65–99)
HCT VFR BLD AUTO: 42.7 % (ref 34–46.6)
HCV AB S/CO SERPL IA: <0.1 S/CO RATIO (ref 0–0.9)
HDLC SERPL-MCNC: 84 MG/DL
HGB BLD-MCNC: 14.5 G/DL (ref 11.1–15.9)
LABORATORY COMMENT REPORT: ABNORMAL
LDLC SERPL CALC-MCNC: 154 MG/DL (ref 0–99)
MCH RBC QN AUTO: 29.4 PG (ref 26.6–33)
MCHC RBC AUTO-ENTMCNC: 34 G/DL (ref 31.5–35.7)
MCV RBC AUTO: 87 FL (ref 79–97)
NRBC BLD AUTO-RTO: NORMAL %
PLATELET # BLD AUTO: 227 X10E3/UL (ref 150–450)
POTASSIUM SERPL-SCNC: 4 MMOL/L (ref 3.5–5.2)
PROT SERPL-MCNC: 7 G/DL (ref 6–8.5)
RBC # BLD AUTO: 4.93 X10E6/UL (ref 3.77–5.28)
SODIUM SERPL-SCNC: 141 MMOL/L (ref 134–144)
T3FREE SERPL-MCNC: 3 PG/ML (ref 2–4.4)
T4 FREE SERPL-MCNC: 1.26 NG/DL (ref 0.82–1.77)
TRIGL SERPL-MCNC: 88 MG/DL (ref 0–149)
TSH SERPL DL<=0.005 MIU/L-ACNC: 3.04 UIU/ML (ref 0.45–4.5)
VLDLC SERPL CALC-MCNC: 15 MG/DL (ref 5–40)
WBC # BLD AUTO: 5 X10E3/UL (ref 3.4–10.8)

## 2020-10-26 ENCOUNTER — APPOINTMENT (RX ONLY)
Dept: URBAN - METROPOLITAN AREA CLINIC 35 | Facility: CLINIC | Age: 60
Setting detail: DERMATOLOGY
End: 2020-10-26

## 2020-10-26 DIAGNOSIS — L81.4 OTHER MELANIN HYPERPIGMENTATION: ICD-10-CM

## 2020-10-26 DIAGNOSIS — L57.0 ACTINIC KERATOSIS: ICD-10-CM

## 2020-10-26 DIAGNOSIS — L82.1 OTHER SEBORRHEIC KERATOSIS: ICD-10-CM

## 2020-10-26 DIAGNOSIS — Z71.89 OTHER SPECIFIED COUNSELING: ICD-10-CM

## 2020-10-26 DIAGNOSIS — D22 MELANOCYTIC NEVI: ICD-10-CM

## 2020-10-26 DIAGNOSIS — Z87.2 PERSONAL HISTORY OF DISEASES OF THE SKIN AND SUBCUTANEOUS TISSUE: ICD-10-CM

## 2020-10-26 PROBLEM — D22.39 MELANOCYTIC NEVI OF OTHER PARTS OF FACE: Status: ACTIVE | Noted: 2020-10-26

## 2020-10-26 PROBLEM — D22.71 MELANOCYTIC NEVI OF RIGHT LOWER LIMB, INCLUDING HIP: Status: ACTIVE | Noted: 2020-10-26

## 2020-10-26 PROCEDURE — ? COUNSELING

## 2020-10-26 PROCEDURE — ? LIQUID NITROGEN

## 2020-10-26 PROCEDURE — 99214 OFFICE O/P EST MOD 30 MIN: CPT | Mod: 25

## 2020-10-26 PROCEDURE — ? OBSERVATION AND MEASURE

## 2020-10-26 PROCEDURE — 17000 DESTRUCT PREMALG LESION: CPT

## 2020-10-26 PROCEDURE — ? ADDITIONAL NOTES

## 2020-10-26 ASSESSMENT — LOCATION SIMPLE DESCRIPTION DERM
LOCATION SIMPLE: RIGHT GREAT TOE
LOCATION SIMPLE: GROIN
LOCATION SIMPLE: CHEST
LOCATION SIMPLE: RIGHT CHEEK
LOCATION SIMPLE: RIGHT HAND
LOCATION SIMPLE: ABDOMEN
LOCATION SIMPLE: RIGHT POSTERIOR THIGH
LOCATION SIMPLE: RIGHT UPPER BACK

## 2020-10-26 ASSESSMENT — LOCATION ZONE DERM
LOCATION ZONE: LEG
LOCATION ZONE: HAND
LOCATION ZONE: VULVA
LOCATION ZONE: TOE
LOCATION ZONE: TRUNK
LOCATION ZONE: FACE

## 2020-10-26 ASSESSMENT — LOCATION DETAILED DESCRIPTION DERM
LOCATION DETAILED: LEFT MEDIAL SUPERIOR CHEST
LOCATION DETAILED: RIGHT CENTRAL MALAR CHEEK
LOCATION DETAILED: RIGHT RADIAL DORSAL HAND
LOCATION DETAILED: RIGHT DISTAL PLANTAR GREAT TOE
LOCATION DETAILED: RIGHT DISTAL POSTERIOR THIGH
LOCATION DETAILED: MONS PUBIS
LOCATION DETAILED: EPIGASTRIC SKIN
LOCATION DETAILED: RIGHT INFERIOR MEDIAL UPPER BACK
LOCATION DETAILED: UPPER STERNUM

## 2020-10-26 NOTE — PROCEDURE: LIQUID NITROGEN
Render Note In Bullet Format When Appropriate: No
Post-Care Instructions: I reviewed with the patient in detail post-care instructions. Patient is to wear sunprotection, and avoid picking at any of the treated lesions. Pt may apply Vaseline to crusted or scabbing areas.
Consent: The patient's consent was obtained including but not limited to risks of crusting, scabbing, blistering, scarring, darker or lighter pigmentary change, recurrence, incomplete removal and infection.
Duration Of Freeze Thaw-Cycle (Seconds): 10
Detail Level: Detailed
Number Of Freeze-Thaw Cycles: 1 freeze-thaw cycle

## 2020-11-09 ENCOUNTER — HOSPITAL ENCOUNTER (OUTPATIENT)
Dept: HOSPITAL 8 - CFH | Age: 60
Discharge: HOME | End: 2020-11-09
Attending: OBSTETRICS & GYNECOLOGY
Payer: COMMERCIAL

## 2020-11-09 DIAGNOSIS — Z12.31: Primary | ICD-10-CM

## 2020-11-09 PROCEDURE — 77063 BREAST TOMOSYNTHESIS BI: CPT

## 2020-11-09 PROCEDURE — 77067 SCR MAMMO BI INCL CAD: CPT

## 2021-04-21 ENCOUNTER — TELEMEDICINE (OUTPATIENT)
Dept: MEDICAL GROUP | Age: 61
End: 2021-04-21
Payer: COMMERCIAL

## 2021-04-21 DIAGNOSIS — B35.6 TINEA CRURIS: ICD-10-CM

## 2021-04-21 PROCEDURE — 99214 OFFICE O/P EST MOD 30 MIN: CPT | Mod: 95,CR | Performed by: FAMILY MEDICINE

## 2021-04-21 RX ORDER — CLOTRIMAZOLE 1 %
CREAM (GRAM) TOPICAL
Qty: 14 G | Refills: 2 | Status: SHIPPED | OUTPATIENT
Start: 2021-04-21 | End: 2023-12-01

## 2021-04-21 RX ORDER — TERBINAFINE HYDROCHLORIDE 250 MG/1
250 TABLET ORAL DAILY
Qty: 14 TABLET | Refills: 0 | Status: SHIPPED | OUTPATIENT
Start: 2021-04-21 | End: 2023-12-01

## 2021-04-22 VITALS — BODY MASS INDEX: 22.22 KG/M2 | WEIGHT: 150 LBS | HEIGHT: 69 IN | RESPIRATION RATE: 11 BRPM

## 2021-04-22 ASSESSMENT — FIBROSIS 4 INDEX: FIB4 SCORE: 1.35

## 2021-04-22 NOTE — PROGRESS NOTES
Virtual Visit: Established Patient   This visit was conducted via Zoom using secure and encrypted videoconferencing technology. The patient was in a private location in the state of [place].    The patient's identity was confirmed and verbal consent was obtained for this virtual visit.    Subjective:   CC: No chief complaint on file.      Yanira Guerrier is a 60 y.o. female presenting for evaluation and management of:    1. Tinea cruris  NEW UNDIAGNOSED PROBLEM    The patient states for the past week or so she has been having this rash that is raised and circular 1 on her right lower abdomen.  She is got another lesion on her left lateral bicep.  She states she is tried putting garlic on the left lateral bicep but it just made it red and more inflamed.  The patient likes to avoid all medications this is why she is tried the alternative treatment.  Patient does not know where she could got this.  She states she is a very clean person she is confused about how this may have been picked up by her.  She does have a dog named Alex who is 8 years old however she states he is very clean as well.  She does not frequent gyms because of the pandemic, has not been going out to restaurants or other shopping centers if not needed.  The patient denies any fevers, no chills, no night sweats, no headaches no night blindness, no change in taste or smell.    ROS   Denies any recent fevers or chills. No nausea or vomiting. No chest pains or shortness of breath.     No Known Allergies    Current medicines (including changes today)  Current Outpatient Medications   Medication Sig Dispense Refill   • clotrimazole (LOTRIMIN) 1 % Cream AAA BID x 4 wks 14 g 2   • terbinafine (LAMISIL) 250 MG Tab Take 1 tablet by mouth every day. 14 tablet 0   • ALPRAZolam (XANAX) 0.5 MG Tab alprazolam 0.5 mg tablet     • pantoprazole (PROTONIX) 20 MG tablet Take 1 Tab by mouth every day. 90 Tab 2   • pantoprazole (PROTONIX) 40 MG Tablet Delayed  Response Take 40 mg by mouth every day.     • Omega-3 Fatty Acids (FISH OIL TRIPLE STRENGTH) 1400 MG Cap Take 2,800 mg by mouth every day.     • Plant Sterols and Stanols 450 MG Cap Take  by mouth.     • Probiotic Product (PROBIOMAX DAILY DF) Cap Take  by mouth.     • Psyllium 0.52 GM Cap Take  by mouth. 540 Cap    • Calcium-Magnesium-Vitamin D (CALCIUM 500 PO) Take  by mouth every day.     • Multiple Vitamin (MULTI VITAMIN DAILY PO) Take 1 Tab by mouth every day.       No current facility-administered medications for this visit.       Patient Active Problem List    Diagnosis Date Noted   • Tinea cruris 04/21/2021   • Agatston coronary artery calcium score less than 100 12/06/2019   • Nasal congestion 09/09/2019   • H/O hysterectomy 06/02/2019   • Sinus pressure 03/06/2019   • Family history of mixed hyperlipidemia 11/07/2018   • Gastroesophageal reflux disease with esophagitis 09/05/2018   • Bilateral low back pain 08/06/2018   • Pure hypercholesterolemia 04/23/2018   • Chronic seasonal allergic rhinitis due to pollen 04/23/2018   • Anxiety 04/23/2018       Family History   Problem Relation Age of Onset   • Cancer Mother         Basal Cell Carcinoma   • Other Mother         CLL?   • Lung Disease Father         Interstitial Lung Disease   • No Known Problems Sister    • No Known Problems Brother    • Cancer Paternal Aunt         Uterine CA   • Cancer Paternal Uncle         Throat CA d/t smoking   • Alcohol/Drug Paternal Uncle         Smoking   • No Known Problems Brother    • Stroke Neg Hx    • Heart Attack Neg Hx        She  has a past medical history of Heart burn, Hiatus hernia syndrome, Indigestion, and Pain (2/2016). She also has no past medical history of Anesthesia.  She  has a past surgical history that includes gyn surgery (2008); other; and uday by laparoscopy (N/A, 2/16/2016).       Objective:   LMP 02/09/2008 (Approximate)     Physical Exam:  Constitutional: Alert, no distress, well-groomed.  Skin:  Right lower abdomen reveals a 2 cm round annular erythematous macule, left lateral bicep reveals erythematous region where she used garlic  Eye: Round. Conjunctiva clear, lids normal. No icterus.   ENMT: Lips pink without lesions, good dentition, moist mucous membranes. Phonation normal.  Neck: No masses, no thyromegaly. Moves freely without pain.  Respiratory: Unlabored respiratory effort, no cough or audible wheeze  Psych: Alert and oriented x3, normal affect and mood.       Assessment and Plan:   The following treatment plan was discussed:     1. Tinea cruris    I instructed her to use Chlortrimazole cream twice daily if no improvement in 1 week she can start the oral Lamisil    - clotrimazole (LOTRIMIN) 1 % Cream; AAA BID x 4 wks  Dispense: 14 g; Refill: 2  - terbinafine (LAMISIL) 250 MG Tab; Take 1 tablet by mouth every day.  Dispense: 14 tablet; Refill: 0        Follow-up: Return in about 4 weeks (around 5/19/2021) for Reevaluation.

## 2021-05-13 DIAGNOSIS — K21.00 GASTROESOPHAGEAL REFLUX DISEASE WITH ESOPHAGITIS: ICD-10-CM

## 2021-05-13 DIAGNOSIS — B35.6 TINEA CRURIS: ICD-10-CM

## 2021-05-13 RX ORDER — PANTOPRAZOLE SODIUM 20 MG/1
20 TABLET, DELAYED RELEASE ORAL DAILY
Qty: 90 TABLET | Refills: 2 | Status: SHIPPED | OUTPATIENT
Start: 2021-05-13 | End: 2022-03-22 | Stop reason: SDUPTHER

## 2021-05-13 RX ORDER — CLOTRIMAZOLE 1 %
CREAM (GRAM) TOPICAL
Qty: 14 G | Refills: 2 | Status: CANCELLED | OUTPATIENT
Start: 2021-05-13

## 2021-05-24 DIAGNOSIS — F41.9 ANXIETY: ICD-10-CM

## 2021-05-25 RX ORDER — ALPRAZOLAM 0.5 MG/1
TABLET ORAL
Qty: 30 TABLET | Refills: 0 | Status: SHIPPED | OUTPATIENT
Start: 2021-05-25 | End: 2021-10-14

## 2021-09-29 ENCOUNTER — OFFICE VISIT (OUTPATIENT)
Dept: MEDICAL GROUP | Age: 61
End: 2021-09-29
Payer: COMMERCIAL

## 2021-09-29 VITALS
OXYGEN SATURATION: 100 % | HEART RATE: 65 BPM | HEIGHT: 69 IN | SYSTOLIC BLOOD PRESSURE: 122 MMHG | BODY MASS INDEX: 23.28 KG/M2 | WEIGHT: 157.2 LBS | RESPIRATION RATE: 16 BRPM | DIASTOLIC BLOOD PRESSURE: 84 MMHG | TEMPERATURE: 97.9 F

## 2021-09-29 DIAGNOSIS — R00.2 PALPITATIONS: ICD-10-CM

## 2021-09-29 DIAGNOSIS — Z00.00 ANNUAL PHYSICAL EXAM: ICD-10-CM

## 2021-09-29 DIAGNOSIS — Z12.31 ENCOUNTER FOR SCREENING MAMMOGRAM FOR BREAST CANCER: ICD-10-CM

## 2021-09-29 DIAGNOSIS — B35.6 TINEA CRURIS: ICD-10-CM

## 2021-09-29 DIAGNOSIS — Z12.11 SCREENING FOR COLON CANCER: ICD-10-CM

## 2021-09-29 DIAGNOSIS — Z23 NEED FOR VACCINATION: ICD-10-CM

## 2021-09-29 PROCEDURE — 90471 IMMUNIZATION ADMIN: CPT | Performed by: FAMILY MEDICINE

## 2021-09-29 PROCEDURE — 90686 IIV4 VACC NO PRSV 0.5 ML IM: CPT | Performed by: FAMILY MEDICINE

## 2021-09-29 PROCEDURE — 99214 OFFICE O/P EST MOD 30 MIN: CPT | Mod: 25 | Performed by: FAMILY MEDICINE

## 2021-09-29 PROCEDURE — 99396 PREV VISIT EST AGE 40-64: CPT | Mod: 25 | Performed by: FAMILY MEDICINE

## 2021-09-29 ASSESSMENT — FIBROSIS 4 INDEX: FIB4 SCORE: 1.35

## 2021-09-29 ASSESSMENT — PATIENT HEALTH QUESTIONNAIRE - PHQ9: CLINICAL INTERPRETATION OF PHQ2 SCORE: 0

## 2021-09-29 NOTE — PROGRESS NOTES
Subjective:     CC:   Chief Complaint   Patient presents with   • Annual Exam       HPI:   Yanira Guerrier is a 60 y.o. female who presents for annual exam    1. Annual physical exam  See below    2. Palpitations  NEW UNDIAGNOSED PROBLEM    The patient is a very pleasant 60-year-old female who states she has been stressed out little bit more.  She is not sure if the stress is causing palpitations at night episodically.  She will wake up feels palpitations she will take half of 0.5 mg Xanax and goes away.  She denies any dyspnea on exertion no shortness of breath no difficulty laying down her back, these episodes are number preceded by changes in vision presyncopal syncopal episodes no nausea or vomiting no diaphoresis.    3. Tinea cruris  The patient continues to have the well demarcated erythematous round rash which topical medication has not worked we prescribed Lamisil but the patient does not feel comfortable taking prescription meds.    4. Encounter for screening mammogram for breast cancer  Due for breast cancer screening    5. Screening for colon cancer  Due for colon cancer screening    6. Need for vaccination    Due for flu  Patient has GYN provider: Yes  Last Pap Smear: up to date   H/O Abnormal Pap: No  Last Mammogram: ovedue  Last Bone Density Test: NA  Last Colorectal Cancer Screening: overdue  Last Tdap: up to date  Received HPV series: Aged out    Exercise: minimal exercise, one hour walking weekly  Diet: regular      Patient's last menstrual period was 02/09/2008 (approximate).  She has not utilized hormone replacement therapy.  Denies any menopausal symptoms.  No significant bloating/fluid retention, pelvic pain, or dyspareunia. No abnormal vaginal discharge.   No breast tenderness, mass, nipple discharge or changes in size or contour.    OB History   No obstetric history on file.      She  reports being sexually active and has had partner(s) who are male.    She  has a past medical history of  Heart burn, Hiatus hernia syndrome, Indigestion, and Pain (2/2016). She also has no past medical history of Anesthesia.  She  has a past surgical history that includes gyn surgery (2008); other; and uday by laparoscopy (N/A, 2/16/2016).    Family History   Problem Relation Age of Onset   • Cancer Mother         Basal Cell Carcinoma   • Other Mother         CLL?   • Lung Disease Father         Interstitial Lung Disease   • No Known Problems Sister    • No Known Problems Brother    • Cancer Paternal Aunt         Uterine CA   • Cancer Paternal Uncle         Throat CA d/t smoking   • Alcohol/Drug Paternal Uncle         Smoking   • No Known Problems Brother    • Stroke Neg Hx    • Heart Attack Neg Hx      Social History     Tobacco Use   • Smoking status: Never Smoker   • Smokeless tobacco: Never Used   Vaping Use   • Vaping Use: Never used   Substance Use Topics   • Alcohol use: Yes     Alcohol/week: 0.6 - 1.2 oz     Types: 1 - 2 Shots of liquor per week   • Drug use: No       Patient Active Problem List    Diagnosis Date Noted   • Palpitations 09/29/2021   • Tinea cruris 04/21/2021   • Agatston coronary artery calcium score less than 100 12/06/2019   • Nasal congestion 09/09/2019   • H/O hysterectomy 06/02/2019   • Sinus pressure 03/06/2019   • Family history of mixed hyperlipidemia 11/07/2018   • Gastroesophageal reflux disease with esophagitis 09/05/2018   • Bilateral low back pain 08/06/2018   • Pure hypercholesterolemia 04/23/2018   • Chronic seasonal allergic rhinitis due to pollen 04/23/2018   • Anxiety 04/23/2018     Current Outpatient Medications   Medication Sig Dispense Refill   • pantoprazole (PROTONIX) 20 MG tablet Take 1 tablet by mouth every day. 90 tablet 2   • clotrimazole (LOTRIMIN) 1 % Cream AAA BID x 4 wks 14 g 2   • terbinafine (LAMISIL) 250 MG Tab Take 1 tablet by mouth every day. 14 tablet 0   • Omega-3 Fatty Acids (FISH OIL TRIPLE STRENGTH) 1400 MG Cap Take 2,800 mg by mouth every day.     •  "Plant Sterols and Stanols 450 MG Cap Take  by mouth.     • Probiotic Product (PROBIOMAX DAILY DF) Cap Take  by mouth.     • Psyllium 0.52 GM Cap Take  by mouth. 540 Cap    • Calcium-Magnesium-Vitamin D (CALCIUM 500 PO) Take  by mouth every day.     • Multiple Vitamin (MULTI VITAMIN DAILY PO) Take 1 Tab by mouth every day.       No current facility-administered medications for this visit.     No Known Allergies    Review of Systems   Constitutional: Negative for fever, chills and malaise/fatigue.   HENT: Negative for congestion.    Eyes: Negative for pain.   Respiratory: Negative for cough and shortness of breath.    Cardiovascular: Negative for chest pain and leg swelling.   Gastrointestinal: Negative for nausea, vomiting, abdominal pain and diarrhea.   Genitourinary: Negative for dysuria and hematuria.   Skin: Negative for rash.   Neurological: Negative for dizziness, focal weakness and headaches.   Endo/Heme/Allergies: Does not bruise/bleed easily.   Psychiatric/Behavioral: Negative for depression.  The patient is not nervous/anxious.      Objective:   /84 (BP Location: Left arm, Patient Position: Sitting, BP Cuff Size: Adult)   Pulse 65   Temp 36.6 °C (97.9 °F) (Temporal)   Resp 16   Ht 1.753 m (5' 9\")   Wt 71.3 kg (157 lb 3.2 oz)   LMP 02/09/2008 (Approximate)   SpO2 100%   BMI 23.21 kg/m²     Wt Readings from Last 4 Encounters:   09/29/21 71.3 kg (157 lb 3.2 oz)   04/22/21 68 kg (150 lb)   09/22/20 67.7 kg (149 lb 4 oz)   07/22/20 68 kg (150 lb)           Physical Exam:  Constitutional: Well-developed and well-nourished. Not diaphoretic. No distress.   Skin: Skin is warm and dry. No rash noted.  Head: Atraumatic without lesions.  Eyes: Conjunctivae and extraocular motions are normal. Pupils are equal, round, and reactive to light. No scleral icterus.   Ears:  External ears unremarkable. Tympanic membranes clear and intact.  Nose: Nares patent. Septum midline. Turbinates without erythema nor edema. " No discharge.   Mouth/Throat: Tongue normal. Oropharynx is clear and moist. Posterior pharynx without erythema or exudates.  Neck: Supple, trachea midline. Normal range of motion. No thyromegaly present. No lymphadenopathy--cervical or supraclavicular.  Cardiovascular: Regular rate and rhythm, S1 and S2 without murmur, rubs, or gallops.    Respiratory: Effort normal. Clear to auscultation throughout. No adventitious sounds.     Abdomen: Soft, non tender, and without distention. Active bowel sounds in all four quadrants. No rebound, guarding, masses or HSM.    Extremities: No cyanosis, clubbing, erythema, nor edema. Distal pulses intact and symmetric.   Musculoskeletal: All major joints AROM full in all directions without pain.  Neurological: Alert and oriented x 3. Grossly non-focal. Strength and sensation grossly intact. DTRs 2+/3 and symmetric.   Psychiatric:  Behavior, mood, and affect are appropriate.    Assessment and Plan:     1. Annual physical exam  - Comp Metabolic Panel; Future  - CBC WITHOUT DIFFERENTIAL; Future  - Lipid Profile; Future    2. Palpitations  - EKG; Future    3. Tinea cruris  - REFERRAL TO DERMATOLOGY    4. Encounter for screening mammogram for breast cancer  - MA-SCREENING MAMMO BILAT W/CAD; Future    5. Screening for colon cancer  - COLOGUARD (FIT DNA)  - REFERRAL TO GASTROENTEROLOGY    6. Need for vaccination  - INFLUENZA VACCINE QUAD INJ (PF)      Health maintenance:     Labs per orders  Immunizations per orders  Patient counseled about skin care, diet, supplements, and exercise.  Discussed  breast self exam     Follow-up: Return in about 6 months (around 3/29/2022) for Reevaluation, labs.

## 2021-09-30 LAB
ALBUMIN SERPL-MCNC: 4.8 G/DL (ref 3.8–4.9)
ALBUMIN/GLOB SERPL: 2.2 {RATIO} (ref 1.2–2.2)
ALP SERPL-CCNC: 59 IU/L (ref 44–121)
ALT SERPL-CCNC: 10 IU/L (ref 0–32)
AST SERPL-CCNC: 22 IU/L (ref 0–40)
BASOPHILS # BLD AUTO: 0 X10E3/UL (ref 0–0.2)
BASOPHILS NFR BLD AUTO: 1 %
BILIRUB SERPL-MCNC: 0.8 MG/DL (ref 0–1.2)
BUN SERPL-MCNC: 8 MG/DL (ref 8–27)
BUN/CREAT SERPL: 12 (ref 12–28)
CALCIUM SERPL-MCNC: 9.7 MG/DL (ref 8.7–10.3)
CHLORIDE SERPL-SCNC: 103 MMOL/L (ref 96–106)
CHOLEST SERPL-MCNC: 257 MG/DL (ref 100–199)
CO2 SERPL-SCNC: 26 MMOL/L (ref 20–29)
CREAT SERPL-MCNC: 0.66 MG/DL (ref 0.57–1)
EOSINOPHIL # BLD AUTO: 0.2 X10E3/UL (ref 0–0.4)
EOSINOPHIL NFR BLD AUTO: 5 %
ERYTHROCYTE [DISTWIDTH] IN BLOOD BY AUTOMATED COUNT: 12.9 % (ref 11.7–15.4)
GLOBULIN SER CALC-MCNC: 2.2 G/DL (ref 1.5–4.5)
GLUCOSE SERPL-MCNC: 93 MG/DL (ref 65–99)
HCT VFR BLD AUTO: 42.6 % (ref 34–46.6)
HDLC SERPL-MCNC: 88 MG/DL
HGB BLD-MCNC: 14.6 G/DL (ref 11.1–15.9)
IMM GRANULOCYTES # BLD AUTO: 0 X10E3/UL (ref 0–0.1)
IMM GRANULOCYTES NFR BLD AUTO: 0 %
IMMATURE CELLS  115398: NORMAL
LABORATORY COMMENT REPORT: ABNORMAL
LDLC SERPL CALC-MCNC: 157 MG/DL (ref 0–99)
LYMPHOCYTES # BLD AUTO: 1.5 X10E3/UL (ref 0.7–3.1)
LYMPHOCYTES NFR BLD AUTO: 33 %
MCH RBC QN AUTO: 29.4 PG (ref 26.6–33)
MCHC RBC AUTO-ENTMCNC: 34.3 G/DL (ref 31.5–35.7)
MCV RBC AUTO: 86 FL (ref 79–97)
MONOCYTES # BLD AUTO: 0.3 X10E3/UL (ref 0.1–0.9)
MONOCYTES NFR BLD AUTO: 7 %
MORPHOLOGY BLD-IMP: NORMAL
NEUTROPHILS # BLD AUTO: 2.6 X10E3/UL (ref 1.4–7)
NEUTROPHILS NFR BLD AUTO: 54 %
NRBC BLD AUTO-RTO: NORMAL %
PLATELET # BLD AUTO: 256 X10E3/UL (ref 150–450)
POTASSIUM SERPL-SCNC: 4.5 MMOL/L (ref 3.5–5.2)
PROT SERPL-MCNC: 7 G/DL (ref 6–8.5)
RBC # BLD AUTO: 4.96 X10E6/UL (ref 3.77–5.28)
SODIUM SERPL-SCNC: 143 MMOL/L (ref 134–144)
TRIGL SERPL-MCNC: 75 MG/DL (ref 0–149)
VLDLC SERPL CALC-MCNC: 12 MG/DL (ref 5–40)
WBC # BLD AUTO: 4.7 X10E3/UL (ref 3.4–10.8)

## 2021-10-13 DIAGNOSIS — F41.9 ANXIETY: ICD-10-CM

## 2021-10-14 RX ORDER — ALPRAZOLAM 0.5 MG/1
TABLET ORAL
Qty: 30 TABLET | Refills: 0 | Status: SHIPPED | OUTPATIENT
Start: 2021-10-14 | End: 2021-11-13

## 2022-02-14 ENCOUNTER — APPOINTMENT (RX ONLY)
Dept: URBAN - METROPOLITAN AREA CLINIC 35 | Facility: CLINIC | Age: 62
Setting detail: DERMATOLOGY
End: 2022-02-14

## 2022-02-14 DIAGNOSIS — Z71.89 OTHER SPECIFIED COUNSELING: ICD-10-CM

## 2022-02-14 DIAGNOSIS — Z87.2 PERSONAL HISTORY OF DISEASES OF THE SKIN AND SUBCUTANEOUS TISSUE: ICD-10-CM

## 2022-02-14 DIAGNOSIS — L81.4 OTHER MELANIN HYPERPIGMENTATION: ICD-10-CM

## 2022-02-14 DIAGNOSIS — L82.1 OTHER SEBORRHEIC KERATOSIS: ICD-10-CM

## 2022-02-14 DIAGNOSIS — D22 MELANOCYTIC NEVI: ICD-10-CM

## 2022-02-14 PROBLEM — D22.71 MELANOCYTIC NEVI OF RIGHT LOWER LIMB, INCLUDING HIP: Status: ACTIVE | Noted: 2022-02-14

## 2022-02-14 PROBLEM — D22.5 MELANOCYTIC NEVI OF TRUNK: Status: ACTIVE | Noted: 2022-02-14

## 2022-02-14 PROBLEM — D22.39 MELANOCYTIC NEVI OF OTHER PARTS OF FACE: Status: ACTIVE | Noted: 2022-02-14

## 2022-02-14 PROBLEM — D22.61 MELANOCYTIC NEVI OF RIGHT UPPER LIMB, INCLUDING SHOULDER: Status: ACTIVE | Noted: 2022-02-14

## 2022-02-14 PROCEDURE — ? COUNSELING

## 2022-02-14 PROCEDURE — ? OBSERVATION AND MEASURE

## 2022-02-14 PROCEDURE — 99213 OFFICE O/P EST LOW 20 MIN: CPT

## 2022-02-14 ASSESSMENT — LOCATION SIMPLE DESCRIPTION DERM
LOCATION SIMPLE: RIGHT CHEEK
LOCATION SIMPLE: RIGHT UPPER BACK
LOCATION SIMPLE: RIGHT FOREHEAD
LOCATION SIMPLE: RIGHT HAND
LOCATION SIMPLE: ABDOMEN
LOCATION SIMPLE: GROIN
LOCATION SIMPLE: CHEST
LOCATION SIMPLE: RIGHT POSTERIOR THIGH
LOCATION SIMPLE: RIGHT GREAT TOE

## 2022-02-14 ASSESSMENT — LOCATION ZONE DERM
LOCATION ZONE: TOE
LOCATION ZONE: HAND
LOCATION ZONE: VULVA
LOCATION ZONE: LEG
LOCATION ZONE: FACE
LOCATION ZONE: TRUNK

## 2022-02-14 ASSESSMENT — LOCATION DETAILED DESCRIPTION DERM
LOCATION DETAILED: RIGHT DISTAL PLANTAR GREAT TOE
LOCATION DETAILED: RIGHT INFERIOR MEDIAL FOREHEAD
LOCATION DETAILED: RIGHT ULNAR PALM
LOCATION DETAILED: RIGHT DISTAL POSTERIOR THIGH
LOCATION DETAILED: RIGHT INFERIOR MEDIAL UPPER BACK
LOCATION DETAILED: RIGHT CENTRAL MALAR CHEEK
LOCATION DETAILED: RIGHT SUPERIOR MEDIAL UPPER BACK
LOCATION DETAILED: LEFT MEDIAL SUPERIOR CHEST
LOCATION DETAILED: MONS PUBIS
LOCATION DETAILED: EPIGASTRIC SKIN

## 2022-03-22 DIAGNOSIS — K21.00 GASTROESOPHAGEAL REFLUX DISEASE WITH ESOPHAGITIS: ICD-10-CM

## 2022-03-22 RX ORDER — PANTOPRAZOLE SODIUM 20 MG/1
20 TABLET, DELAYED RELEASE ORAL DAILY
Qty: 90 TABLET | Refills: 0 | Status: SHIPPED | OUTPATIENT
Start: 2022-03-22 | End: 2022-06-21

## 2022-06-20 DIAGNOSIS — K21.00 GASTROESOPHAGEAL REFLUX DISEASE WITH ESOPHAGITIS: ICD-10-CM

## 2022-06-21 RX ORDER — PANTOPRAZOLE SODIUM 20 MG/1
20 TABLET, DELAYED RELEASE ORAL DAILY
Qty: 90 TABLET | Refills: 0 | Status: SHIPPED | OUTPATIENT
Start: 2022-06-21 | End: 2022-09-20

## 2022-09-20 DIAGNOSIS — K21.00 GASTROESOPHAGEAL REFLUX DISEASE WITH ESOPHAGITIS: ICD-10-CM

## 2022-09-20 RX ORDER — PANTOPRAZOLE SODIUM 20 MG/1
20 TABLET, DELAYED RELEASE ORAL DAILY
Qty: 90 TABLET | Refills: 0 | Status: SHIPPED | OUTPATIENT
Start: 2022-09-20 | End: 2022-12-21

## 2022-09-20 NOTE — TELEPHONE ENCOUNTER
Received request via: Pharmacy    Was the patient seen in the last year in this department? No, DUE for an annual exam    Does the patient have an active prescription (recently filled or refills available) for medication(s) requested? No

## 2022-12-14 ENCOUNTER — APPOINTMENT (RX ONLY)
Dept: URBAN - METROPOLITAN AREA CLINIC 35 | Facility: CLINIC | Age: 62
Setting detail: DERMATOLOGY
End: 2022-12-14

## 2022-12-14 DIAGNOSIS — D22 MELANOCYTIC NEVI: ICD-10-CM

## 2022-12-14 DIAGNOSIS — L81.4 OTHER MELANIN HYPERPIGMENTATION: ICD-10-CM

## 2022-12-14 DIAGNOSIS — Z71.89 OTHER SPECIFIED COUNSELING: ICD-10-CM

## 2022-12-14 DIAGNOSIS — L82.1 OTHER SEBORRHEIC KERATOSIS: ICD-10-CM

## 2022-12-14 DIAGNOSIS — Z87.2 PERSONAL HISTORY OF DISEASES OF THE SKIN AND SUBCUTANEOUS TISSUE: ICD-10-CM

## 2022-12-14 PROBLEM — D22.71 MELANOCYTIC NEVI OF RIGHT LOWER LIMB, INCLUDING HIP: Status: ACTIVE | Noted: 2022-12-14

## 2022-12-14 PROBLEM — D22.5 MELANOCYTIC NEVI OF TRUNK: Status: ACTIVE | Noted: 2022-12-14

## 2022-12-14 PROBLEM — D22.61 MELANOCYTIC NEVI OF RIGHT UPPER LIMB, INCLUDING SHOULDER: Status: ACTIVE | Noted: 2022-12-14

## 2022-12-14 PROBLEM — D22.39 MELANOCYTIC NEVI OF OTHER PARTS OF FACE: Status: ACTIVE | Noted: 2022-12-14

## 2022-12-14 PROCEDURE — 99213 OFFICE O/P EST LOW 20 MIN: CPT

## 2022-12-14 PROCEDURE — ? OBSERVATION AND MEASURE

## 2022-12-14 PROCEDURE — ? COUNSELING

## 2022-12-14 ASSESSMENT — LOCATION DETAILED DESCRIPTION DERM
LOCATION DETAILED: RIGHT INFERIOR MEDIAL FOREHEAD
LOCATION DETAILED: MONS PUBIS
LOCATION DETAILED: RIGHT DISTAL PLANTAR GREAT TOE
LOCATION DETAILED: RIGHT CENTRAL MALAR CHEEK
LOCATION DETAILED: RIGHT SUPERIOR MEDIAL UPPER BACK
LOCATION DETAILED: EPIGASTRIC SKIN
LOCATION DETAILED: LEFT MEDIAL SUPERIOR CHEST
LOCATION DETAILED: RIGHT DISTAL POSTERIOR THIGH
LOCATION DETAILED: RIGHT INFERIOR MEDIAL UPPER BACK
LOCATION DETAILED: RIGHT ULNAR PALM

## 2022-12-14 ASSESSMENT — LOCATION ZONE DERM
LOCATION ZONE: LEG
LOCATION ZONE: FACE
LOCATION ZONE: TOE
LOCATION ZONE: HAND
LOCATION ZONE: VULVA
LOCATION ZONE: TRUNK

## 2022-12-14 ASSESSMENT — LOCATION SIMPLE DESCRIPTION DERM
LOCATION SIMPLE: CHEST
LOCATION SIMPLE: RIGHT FOREHEAD
LOCATION SIMPLE: RIGHT CHEEK
LOCATION SIMPLE: RIGHT GREAT TOE
LOCATION SIMPLE: RIGHT HAND
LOCATION SIMPLE: RIGHT POSTERIOR THIGH
LOCATION SIMPLE: GROIN
LOCATION SIMPLE: RIGHT UPPER BACK
LOCATION SIMPLE: ABDOMEN

## 2022-12-21 DIAGNOSIS — K21.00 GASTROESOPHAGEAL REFLUX DISEASE WITH ESOPHAGITIS: ICD-10-CM

## 2022-12-21 RX ORDER — PANTOPRAZOLE SODIUM 20 MG/1
20 TABLET, DELAYED RELEASE ORAL DAILY
Qty: 90 TABLET | Refills: 0 | Status: SHIPPED | OUTPATIENT
Start: 2022-12-21 | End: 2023-03-23

## 2022-12-21 NOTE — TELEPHONE ENCOUNTER
Received request via: Pharmacy    Was the patient seen in the last year in this department? No    Does the patient have an active prescription (recently filled or refills available) for medication(s) requested? No    Does the patient have [facility] and need 100 day supply (blood pressure, diabetes and cholesterol meds only)? Patient does not have SCP

## 2023-03-23 DIAGNOSIS — K21.00 GASTROESOPHAGEAL REFLUX DISEASE WITH ESOPHAGITIS: ICD-10-CM

## 2023-03-23 RX ORDER — PANTOPRAZOLE SODIUM 20 MG/1
20 TABLET, DELAYED RELEASE ORAL DAILY
Qty: 90 TABLET | Refills: 0 | Status: SHIPPED | OUTPATIENT
Start: 2023-03-23 | End: 2023-06-20

## 2023-06-20 DIAGNOSIS — K21.00 GASTROESOPHAGEAL REFLUX DISEASE WITH ESOPHAGITIS: ICD-10-CM

## 2023-06-20 RX ORDER — PANTOPRAZOLE SODIUM 20 MG/1
20 TABLET, DELAYED RELEASE ORAL DAILY
Qty: 90 TABLET | Refills: 0 | Status: SHIPPED | OUTPATIENT
Start: 2023-06-20 | End: 2023-07-19

## 2023-07-18 DIAGNOSIS — K21.00 GASTROESOPHAGEAL REFLUX DISEASE WITH ESOPHAGITIS: ICD-10-CM

## 2023-07-19 RX ORDER — PANTOPRAZOLE SODIUM 20 MG/1
20 TABLET, DELAYED RELEASE ORAL DAILY
Qty: 90 TABLET | Refills: 0 | Status: SHIPPED | OUTPATIENT
Start: 2023-07-19 | End: 2024-01-19 | Stop reason: SDUPTHER

## 2023-12-01 ENCOUNTER — OFFICE VISIT (OUTPATIENT)
Dept: URGENT CARE | Facility: CLINIC | Age: 63
End: 2023-12-01
Payer: COMMERCIAL

## 2023-12-01 ENCOUNTER — HOSPITAL ENCOUNTER (OUTPATIENT)
Facility: MEDICAL CENTER | Age: 63
End: 2023-12-01
Attending: NURSE PRACTITIONER
Payer: COMMERCIAL

## 2023-12-01 ENCOUNTER — APPOINTMENT (OUTPATIENT)
Dept: URGENT CARE | Facility: CLINIC | Age: 63
End: 2023-12-01
Payer: COMMERCIAL

## 2023-12-01 VITALS
BODY MASS INDEX: 22.96 KG/M2 | WEIGHT: 155 LBS | DIASTOLIC BLOOD PRESSURE: 82 MMHG | TEMPERATURE: 99.1 F | HEART RATE: 81 BPM | RESPIRATION RATE: 20 BRPM | HEIGHT: 69 IN | OXYGEN SATURATION: 96 % | SYSTOLIC BLOOD PRESSURE: 102 MMHG

## 2023-12-01 DIAGNOSIS — M54.50 ACUTE BILATERAL LOW BACK PAIN WITHOUT SCIATICA: ICD-10-CM

## 2023-12-01 DIAGNOSIS — R30.0 DYSURIA: ICD-10-CM

## 2023-12-01 DIAGNOSIS — J02.9 PHARYNGITIS, UNSPECIFIED ETIOLOGY: ICD-10-CM

## 2023-12-01 LAB
APPEARANCE UR: CLEAR
BILIRUB UR STRIP-MCNC: NEGATIVE MG/DL
COLOR UR AUTO: YELLOW
FLUAV RNA SPEC QL NAA+PROBE: NEGATIVE
FLUBV RNA SPEC QL NAA+PROBE: NEGATIVE
GLUCOSE UR STRIP.AUTO-MCNC: NEGATIVE MG/DL
KETONES UR STRIP.AUTO-MCNC: NEGATIVE MG/DL
LEUKOCYTE ESTERASE UR QL STRIP.AUTO: NEGATIVE
NITRITE UR QL STRIP.AUTO: NEGATIVE
PH UR STRIP.AUTO: 5.5 [PH] (ref 5–8)
PROT UR QL STRIP: NEGATIVE MG/DL
RBC UR QL AUTO: NEGATIVE
RSV RNA SPEC QL NAA+PROBE: NEGATIVE
S PYO DNA SPEC NAA+PROBE: NOT DETECTED
SARS-COV-2 RNA RESP QL NAA+PROBE: NEGATIVE
SP GR UR STRIP.AUTO: 1.02
UROBILINOGEN UR STRIP-MCNC: 0.2 MG/DL

## 2023-12-01 PROCEDURE — 99213 OFFICE O/P EST LOW 20 MIN: CPT | Performed by: NURSE PRACTITIONER

## 2023-12-01 PROCEDURE — 87651 STREP A DNA AMP PROBE: CPT | Performed by: NURSE PRACTITIONER

## 2023-12-01 PROCEDURE — 3079F DIAST BP 80-89 MM HG: CPT | Performed by: NURSE PRACTITIONER

## 2023-12-01 PROCEDURE — 3074F SYST BP LT 130 MM HG: CPT | Performed by: NURSE PRACTITIONER

## 2023-12-01 PROCEDURE — 0241U POCT CEPHEID COV-2, FLU A/B, RSV - PCR: CPT | Performed by: NURSE PRACTITIONER

## 2023-12-01 PROCEDURE — 87086 URINE CULTURE/COLONY COUNT: CPT

## 2023-12-01 PROCEDURE — 81002 URINALYSIS NONAUTO W/O SCOPE: CPT | Performed by: NURSE PRACTITIONER

## 2023-12-01 RX ORDER — ESTRADIOL 10 UG/1
INSERT VAGINAL
COMMUNITY

## 2023-12-01 RX ORDER — ALPRAZOLAM 0.5 MG/1
TABLET ORAL
COMMUNITY
End: 2024-01-19

## 2023-12-01 ASSESSMENT — ENCOUNTER SYMPTOMS
SORE THROAT: 1
NECK PAIN: 1
COUGH: 1
FEVER: 0
ABDOMINAL PAIN: 0
FLANK PAIN: 0

## 2023-12-01 NOTE — PATIENT INSTRUCTIONS
Symptomatic care.  -Oral hydration and rest.    -Warm salt water gargles.  -OTC Throat lozenges or spray (Cepacol).  -Temperature Therapy: Heat or ice, whichever feels better.    Seek emergency medical care immediately for: abdominal pain, trouble breathing, persistent pain or pressure in the chest, confusion, inability to wake or stay awake, bluish lips or face, persistent tachycardia (fast heart rate), prolonged dizziness, persistent high grade fevers, weakness, loss of bowel or bladder, groin pain or numbness. Follow up for persistent throat pain, difficulty swallowing, persistent fevers, persistent or worsening flank pain, difficulty with urination, or any other concerns. Follow up with your Primary Care Provider.

## 2023-12-01 NOTE — PROGRESS NOTES
Subjective:     Yanira Guerrier is a 62 y.o. female who presents for Dysuria (X1 week, burning with urination, urgency to urinate, and lower back pain )      Presents with concerns for a UTI. Has had urgency x 1 week. Lower back pain. Stating she does have a hx of back pain. Scratchy throat has started to develop today. Chronic cough in the AM from allergies. Denies vaginal symptoms. States she will be leaving town on Sunday.     Dysuria   This is a new problem. The current episode started in the past 7 days. Associated symptoms include frequency. Pertinent negatives include no flank pain or hematuria.       Past Medical History:   Diagnosis Date    Heart burn     Hiatus hernia syndrome     Indigestion     Pain 2/2016    from gallbladder       Past Surgical History:   Procedure Laterality Date    FAWN BY LAPAROSCOPY N/A 2/16/2016    Procedure: FAWN BY LAPAROSCOPY;  Surgeon: Clifton Vinson M.D.;  Location: SURGERY [facility];  Service:     GYN SURGERY  2008    hysterectomy    OTHER      tonsillectomy as a child       Social History     Socioeconomic History    Marital status:      Spouse name: Not on file    Number of children: Not on file    Years of education: Not on file    Highest education level: Not on file   Occupational History    Not on file   Tobacco Use    Smoking status: Never    Smokeless tobacco: Never   Vaping Use    Vaping Use: Never used   Substance and Sexual Activity    Alcohol use: Yes     Alcohol/week: 0.6 - 1.2 oz     Types: 1 - 2 Shots of liquor per week     Comment: occ    Drug use: No    Sexual activity: Yes     Partners: Male   Other Topics Concern    Not on file   Social History Narrative    Not on file     Social Determinants of Health     Financial Resource Strain: Not on file   Food Insecurity: Not on file   Transportation Needs: Not on file   Physical Activity: Not on file   Stress: Not on file   Social Connections: Not on file   Intimate Partner Violence: Not on  "file   Housing Stability: Not on file        Family History   Problem Relation Age of Onset    Cancer Mother         Basal Cell Carcinoma    Other Mother         CLL?    Lung Disease Father         Interstitial Lung Disease    No Known Problems Sister     No Known Problems Brother     Cancer Paternal Aunt         Uterine CA    Cancer Paternal Uncle         Throat CA d/t smoking    Alcohol/Drug Paternal Uncle         Smoking    No Known Problems Brother     Stroke Neg Hx     Heart Attack Neg Hx         No Known Allergies    Review of Systems   Constitutional:  Negative for fever.   HENT:  Positive for sore throat.    Respiratory:  Positive for cough. Negative for shortness of breath and wheezing.    Gastrointestinal:  Negative for abdominal pain.   Genitourinary:  Positive for dysuria and frequency. Negative for flank pain and hematuria.   Musculoskeletal:  Positive for neck pain.   Skin:  Negative for rash.   All other systems reviewed and are negative.       Objective:   /82   Pulse 81   Temp 37.3 °C (99.1 °F) (Temporal)   Resp 20   Ht 1.753 m (5' 9\")   Wt 70.3 kg (155 lb)   LMP 02/09/2008 (Approximate)   SpO2 96%   Breastfeeding No   BMI 22.89 kg/m²     Physical Exam  Vitals reviewed.   Constitutional:       General: She is not in acute distress.     Appearance: She is well-developed. She is not toxic-appearing.   HENT:      Head: Normocephalic and atraumatic.      Right Ear: External ear normal.      Left Ear: External ear normal.      Nose: Congestion present.      Mouth/Throat:      Mouth: Mucous membranes are moist.      Pharynx: Posterior oropharyngeal erythema present.   Eyes:      Conjunctiva/sclera: Conjunctivae normal.   Cardiovascular:      Rate and Rhythm: Normal rate.   Pulmonary:      Effort: Pulmonary effort is normal.      Breath sounds: Normal breath sounds.   Abdominal:      General: There is no distension.      Palpations: Abdomen is soft.      Tenderness: There is no abdominal " tenderness. There is no right CVA tenderness or left CVA tenderness.   Musculoskeletal:      Cervical back: Neck supple.      Lumbar back: Tenderness present.   Skin:     General: Skin is warm and dry.      Findings: No rash.   Neurological:      Mental Status: She is alert and oriented to person, place, and time.      GCS: GCS eye subscore is 4. GCS verbal subscore is 5. GCS motor subscore is 6.   Psychiatric:         Speech: Speech normal.         Behavior: Behavior normal.         Thought Content: Thought content normal.         Judgment: Judgment normal.         Assessment/Plan:   1. Dysuria  - URINE CULTURE(NEW); Future  - POCT Urinalysis    2. Acute bilateral low back pain without sciatica  - URINE CULTURE(NEW); Future  - POCT Urinalysis    3. Pharyngitis, unspecified etiology  - POCT CEPHEID COV-2, FLU A/B, RSV - PCR  - POCT CEPHEID GROUP A STREP - PCR    Results for orders placed or performed in visit on 12/01/23   POCT CEPHEID COV-2, FLU A/B, RSV - PCR   Result Value Ref Range    SARS-CoV-2 by PCR Negative Negative, Invalid    Influenza virus A RNA Negative Negative, Invalid    Influenza virus B, PCR Negative Negative, Invalid    RSV, PCR Negative Negative, Invalid   POCT CEPHEID GROUP A STREP - PCR   Result Value Ref Range    POC Group A Strep, PCR Not Detected Not Detected, Invalid   POCT Urinalysis   Result Value Ref Range    POC Color Yellow Negative    POC Appearance Clear Negative    POC Glucose Negative Negative mg/dL    POC Bilirubin Negative Negative mg/dL    POC Ketones Negative Negative mg/dL    POC Specific Gravity 1.020 <1.005 - >1.030    POC Blood Negative Negative    POC Urine PH 5.5 5.0 - 8.0    POC Protein Negative Negative mg/dL    POC Urobiligen 0.2 Negative (0.2) mg/dL    POC Nitrites Negative Negative    POC Leukocyte Esterase Negative Negative   Symptomatic care.  -Oral hydration and rest.    -Warm salt water gargles.  -OTC Throat lozenges or spray (Cepacol).  -Temperature Therapy:  Heat or ice, whichever feels better.    Seek emergency medical care immediately for: abdominal pain, trouble breathing, persistent pain or pressure in the chest, confusion, inability to wake or stay awake, bluish lips or face, persistent tachycardia (fast heart rate), prolonged dizziness, persistent high grade fevers, weakness, loss of bowel or bladder, groin pain or numbness. Follow up for persistent throat pain, difficulty swallowing, persistent fevers, persistent or worsening flank pain, difficulty with urination, or any other concerns. Follow up with your Primary Care Provider.     -UA is negative. Patient reports having a positive home test. Urine culture ordered based on symptoms and reported positive at home testing. Has bilateral lower lumbar TTP, correlating with musculoskeletal pain. Encouraged oral hydration. Also has oropharyngeal erythema on exam with reports of acute scratchy throat. Discussed viral etiology, and symptomatic care. Stable Vitals.    Differential diagnosis, natural history, supportive care, and indications for immediate follow-up discussed.

## 2023-12-04 LAB
BACTERIA UR CULT: NORMAL
SIGNIFICANT IND 70042: NORMAL
SITE SITE: NORMAL
SOURCE SOURCE: NORMAL

## 2023-12-05 ASSESSMENT — ENCOUNTER SYMPTOMS
WHEEZING: 0
SHORTNESS OF BREATH: 0

## 2024-01-08 ENCOUNTER — APPOINTMENT (RX ONLY)
Dept: URBAN - METROPOLITAN AREA CLINIC 35 | Facility: CLINIC | Age: 64
Setting detail: DERMATOLOGY
End: 2024-01-08

## 2024-01-08 DIAGNOSIS — D22 MELANOCYTIC NEVI: ICD-10-CM

## 2024-01-08 DIAGNOSIS — L81.4 OTHER MELANIN HYPERPIGMENTATION: ICD-10-CM

## 2024-01-08 DIAGNOSIS — D36.1 BENIGN NEOPLASM OF PERIPHERAL NERVES AND AUTONOMIC NERVOUS SYSTEM: ICD-10-CM

## 2024-01-08 DIAGNOSIS — Z87.2 PERSONAL HISTORY OF DISEASES OF THE SKIN AND SUBCUTANEOUS TISSUE: ICD-10-CM

## 2024-01-08 DIAGNOSIS — D18.0 HEMANGIOMA: ICD-10-CM

## 2024-01-08 DIAGNOSIS — L82.1 OTHER SEBORRHEIC KERATOSIS: ICD-10-CM

## 2024-01-08 DIAGNOSIS — L72.0 EPIDERMAL CYST: ICD-10-CM

## 2024-01-08 DIAGNOSIS — Z71.89 OTHER SPECIFIED COUNSELING: ICD-10-CM

## 2024-01-08 PROBLEM — D22.61 MELANOCYTIC NEVI OF RIGHT UPPER LIMB, INCLUDING SHOULDER: Status: ACTIVE | Noted: 2024-01-08

## 2024-01-08 PROBLEM — D22.39 MELANOCYTIC NEVI OF OTHER PARTS OF FACE: Status: ACTIVE | Noted: 2024-01-08

## 2024-01-08 PROBLEM — D18.01 HEMANGIOMA OF SKIN AND SUBCUTANEOUS TISSUE: Status: ACTIVE | Noted: 2024-01-08

## 2024-01-08 PROBLEM — D22.71 MELANOCYTIC NEVI OF RIGHT LOWER LIMB, INCLUDING HIP: Status: ACTIVE | Noted: 2024-01-08

## 2024-01-08 PROBLEM — D48.5 NEOPLASM OF UNCERTAIN BEHAVIOR OF SKIN: Status: ACTIVE | Noted: 2024-01-08

## 2024-01-08 PROBLEM — D22.5 MELANOCYTIC NEVI OF TRUNK: Status: ACTIVE | Noted: 2024-01-08

## 2024-01-08 PROCEDURE — ? COUNSELING

## 2024-01-08 PROCEDURE — ? ADDITIONAL NOTES

## 2024-01-08 PROCEDURE — 99213 OFFICE O/P EST LOW 20 MIN: CPT

## 2024-01-08 PROCEDURE — ? OBSERVATION AND MEASURE

## 2024-01-08 ASSESSMENT — LOCATION ZONE DERM
LOCATION ZONE: TOE
LOCATION ZONE: NECK
LOCATION ZONE: FACE
LOCATION ZONE: LEG
LOCATION ZONE: VULVA
LOCATION ZONE: HAND
LOCATION ZONE: TRUNK

## 2024-01-08 ASSESSMENT — LOCATION SIMPLE DESCRIPTION DERM
LOCATION SIMPLE: RIGHT HAND
LOCATION SIMPLE: RIGHT UPPER BACK
LOCATION SIMPLE: RIGHT EYEBROW
LOCATION SIMPLE: RIGHT GREAT TOE
LOCATION SIMPLE: RIGHT CHEEK
LOCATION SIMPLE: LEFT ANTERIOR NECK
LOCATION SIMPLE: RIGHT POSTERIOR THIGH
LOCATION SIMPLE: GROIN
LOCATION SIMPLE: CHEST
LOCATION SIMPLE: VULVA
LOCATION SIMPLE: ABDOMEN
LOCATION SIMPLE: RIGHT FOREHEAD

## 2024-01-08 ASSESSMENT — LOCATION DETAILED DESCRIPTION DERM
LOCATION DETAILED: RIGHT LABIA MAJORA
LOCATION DETAILED: RIGHT CENTRAL MALAR CHEEK
LOCATION DETAILED: RIGHT INFERIOR MEDIAL UPPER BACK
LOCATION DETAILED: MONS PUBIS
LOCATION DETAILED: RIGHT INFERIOR MEDIAL FOREHEAD
LOCATION DETAILED: RIGHT ULNAR PALM
LOCATION DETAILED: RIGHT SUPERIOR MEDIAL UPPER BACK
LOCATION DETAILED: EPIGASTRIC SKIN
LOCATION DETAILED: LEFT MEDIAL SUPERIOR CHEST
LOCATION DETAILED: RIGHT MEDIAL EYEBROW
LOCATION DETAILED: RIGHT DISTAL POSTERIOR THIGH
LOCATION DETAILED: LEFT SUPERIOR LATERAL NECK
LOCATION DETAILED: RIGHT DISTAL PLANTAR GREAT TOE

## 2024-01-08 NOTE — PROCEDURE: ADDITIONAL NOTES
Render Risk Assessment In Note?: no
Additional Notes: Discussed shave removal if bothersome
Detail Level: Simple

## 2024-01-18 SDOH — HEALTH STABILITY: MENTAL HEALTH
STRESS IS WHEN SOMEONE FEELS TENSE, NERVOUS, ANXIOUS, OR CAN'T SLEEP AT NIGHT BECAUSE THEIR MIND IS TROUBLED. HOW STRESSED ARE YOU?: TO SOME EXTENT

## 2024-01-18 SDOH — ECONOMIC STABILITY: HOUSING INSECURITY
IN THE LAST 12 MONTHS, WAS THERE A TIME WHEN YOU DID NOT HAVE A STEADY PLACE TO SLEEP OR SLEPT IN A SHELTER (INCLUDING NOW)?: NO

## 2024-01-18 SDOH — HEALTH STABILITY: PHYSICAL HEALTH: ON AVERAGE, HOW MANY MINUTES DO YOU ENGAGE IN EXERCISE AT THIS LEVEL?: 30 MIN

## 2024-01-18 SDOH — HEALTH STABILITY: PHYSICAL HEALTH: ON AVERAGE, HOW MANY DAYS PER WEEK DO YOU ENGAGE IN MODERATE TO STRENUOUS EXERCISE (LIKE A BRISK WALK)?: 7 DAYS

## 2024-01-18 SDOH — ECONOMIC STABILITY: FOOD INSECURITY: WITHIN THE PAST 12 MONTHS, YOU WORRIED THAT YOUR FOOD WOULD RUN OUT BEFORE YOU GOT MONEY TO BUY MORE.: NEVER TRUE

## 2024-01-18 SDOH — ECONOMIC STABILITY: INCOME INSECURITY: IN THE LAST 12 MONTHS, WAS THERE A TIME WHEN YOU WERE NOT ABLE TO PAY THE MORTGAGE OR RENT ON TIME?: NO

## 2024-01-18 SDOH — ECONOMIC STABILITY: FOOD INSECURITY: WITHIN THE PAST 12 MONTHS, THE FOOD YOU BOUGHT JUST DIDN'T LAST AND YOU DIDN'T HAVE MONEY TO GET MORE.: NEVER TRUE

## 2024-01-18 SDOH — ECONOMIC STABILITY: TRANSPORTATION INSECURITY
IN THE PAST 12 MONTHS, HAS THE LACK OF TRANSPORTATION KEPT YOU FROM MEDICAL APPOINTMENTS OR FROM GETTING MEDICATIONS?: NO

## 2024-01-18 SDOH — ECONOMIC STABILITY: HOUSING INSECURITY: IN THE LAST 12 MONTHS, HOW MANY PLACES HAVE YOU LIVED?: 1

## 2024-01-18 SDOH — ECONOMIC STABILITY: INCOME INSECURITY: HOW HARD IS IT FOR YOU TO PAY FOR THE VERY BASICS LIKE FOOD, HOUSING, MEDICAL CARE, AND HEATING?: NOT HARD AT ALL

## 2024-01-18 SDOH — ECONOMIC STABILITY: TRANSPORTATION INSECURITY
IN THE PAST 12 MONTHS, HAS LACK OF TRANSPORTATION KEPT YOU FROM MEETINGS, WORK, OR FROM GETTING THINGS NEEDED FOR DAILY LIVING?: NO

## 2024-01-18 SDOH — ECONOMIC STABILITY: TRANSPORTATION INSECURITY
IN THE PAST 12 MONTHS, HAS LACK OF RELIABLE TRANSPORTATION KEPT YOU FROM MEDICAL APPOINTMENTS, MEETINGS, WORK OR FROM GETTING THINGS NEEDED FOR DAILY LIVING?: NO

## 2024-01-18 ASSESSMENT — LIFESTYLE VARIABLES
SKIP TO QUESTIONS 9-10: 1
HOW OFTEN DO YOU HAVE SIX OR MORE DRINKS ON ONE OCCASION: NEVER
HOW OFTEN DO YOU HAVE A DRINK CONTAINING ALCOHOL: 2-4 TIMES A MONTH
HOW MANY STANDARD DRINKS CONTAINING ALCOHOL DO YOU HAVE ON A TYPICAL DAY: 1 OR 2
AUDIT-C TOTAL SCORE: 2

## 2024-01-18 ASSESSMENT — SOCIAL DETERMINANTS OF HEALTH (SDOH)
HOW OFTEN DO YOU ATTEND CHURCH OR RELIGIOUS SERVICES?: MORE THAN 4 TIMES PER YEAR
HOW OFTEN DO YOU ATTEND CHURCH OR RELIGIOUS SERVICES?: MORE THAN 4 TIMES PER YEAR
IN A TYPICAL WEEK, HOW MANY TIMES DO YOU TALK ON THE PHONE WITH FAMILY, FRIENDS, OR NEIGHBORS?: MORE THAN THREE TIMES A WEEK
HOW OFTEN DO YOU GET TOGETHER WITH FRIENDS OR RELATIVES?: ONCE A WEEK
IN A TYPICAL WEEK, HOW MANY TIMES DO YOU TALK ON THE PHONE WITH FAMILY, FRIENDS, OR NEIGHBORS?: MORE THAN THREE TIMES A WEEK
HOW OFTEN DO YOU ATTENT MEETINGS OF THE CLUB OR ORGANIZATION YOU BELONG TO?: MORE THAN 4 TIMES PER YEAR
HOW OFTEN DO YOU ATTENT MEETINGS OF THE CLUB OR ORGANIZATION YOU BELONG TO?: MORE THAN 4 TIMES PER YEAR
DO YOU BELONG TO ANY CLUBS OR ORGANIZATIONS SUCH AS CHURCH GROUPS UNIONS, FRATERNAL OR ATHLETIC GROUPS, OR SCHOOL GROUPS?: YES
WITHIN THE PAST 12 MONTHS, YOU WORRIED THAT YOUR FOOD WOULD RUN OUT BEFORE YOU GOT THE MONEY TO BUY MORE: NEVER TRUE
DO YOU BELONG TO ANY CLUBS OR ORGANIZATIONS SUCH AS CHURCH GROUPS UNIONS, FRATERNAL OR ATHLETIC GROUPS, OR SCHOOL GROUPS?: YES
HOW OFTEN DO YOU GET TOGETHER WITH FRIENDS OR RELATIVES?: ONCE A WEEK
HOW OFTEN DO YOU HAVE SIX OR MORE DRINKS ON ONE OCCASION: NEVER
HOW HARD IS IT FOR YOU TO PAY FOR THE VERY BASICS LIKE FOOD, HOUSING, MEDICAL CARE, AND HEATING?: NOT HARD AT ALL
HOW MANY DRINKS CONTAINING ALCOHOL DO YOU HAVE ON A TYPICAL DAY WHEN YOU ARE DRINKING: 1 OR 2
HOW OFTEN DO YOU HAVE A DRINK CONTAINING ALCOHOL: 2-4 TIMES A MONTH

## 2024-01-19 ENCOUNTER — OFFICE VISIT (OUTPATIENT)
Dept: MEDICAL GROUP | Facility: MEDICAL CENTER | Age: 64
End: 2024-01-19
Payer: COMMERCIAL

## 2024-01-19 VITALS
DIASTOLIC BLOOD PRESSURE: 60 MMHG | TEMPERATURE: 97.1 F | HEART RATE: 71 BPM | OXYGEN SATURATION: 97 % | SYSTOLIC BLOOD PRESSURE: 110 MMHG | HEIGHT: 69 IN | WEIGHT: 163 LBS | BODY MASS INDEX: 24.14 KG/M2

## 2024-01-19 DIAGNOSIS — K21.00 GASTROESOPHAGEAL REFLUX DISEASE WITH ESOPHAGITIS, UNSPECIFIED WHETHER HEMORRHAGE: ICD-10-CM

## 2024-01-19 DIAGNOSIS — E78.00 PURE HYPERCHOLESTEROLEMIA: ICD-10-CM

## 2024-01-19 DIAGNOSIS — F41.1 GAD (GENERALIZED ANXIETY DISORDER): ICD-10-CM

## 2024-01-19 DIAGNOSIS — Z00.00 WELL ADULT EXAM: ICD-10-CM

## 2024-01-19 DIAGNOSIS — Z83.3 FAMILY HISTORY OF DIABETES MELLITUS IN MOTHER: ICD-10-CM

## 2024-01-19 DIAGNOSIS — Z23 NEED FOR VACCINATION: ICD-10-CM

## 2024-01-19 DIAGNOSIS — M85.80 OSTEOPENIA, UNSPECIFIED LOCATION: ICD-10-CM

## 2024-01-19 PROBLEM — B35.6 TINEA CRURIS: Status: RESOLVED | Noted: 2021-04-21 | Resolved: 2024-01-19

## 2024-01-19 PROBLEM — R09.81 NASAL CONGESTION: Status: RESOLVED | Noted: 2019-09-09 | Resolved: 2024-01-19

## 2024-01-19 PROBLEM — M54.50 BILATERAL LOW BACK PAIN: Status: RESOLVED | Noted: 2018-08-06 | Resolved: 2024-01-19

## 2024-01-19 PROBLEM — R00.2 PALPITATIONS: Status: RESOLVED | Noted: 2021-09-29 | Resolved: 2024-01-19

## 2024-01-19 PROBLEM — J34.89 SINUS PRESSURE: Status: RESOLVED | Noted: 2019-03-06 | Resolved: 2024-01-19

## 2024-01-19 PROCEDURE — 99204 OFFICE O/P NEW MOD 45 MIN: CPT | Performed by: INTERNAL MEDICINE

## 2024-01-19 PROCEDURE — 3074F SYST BP LT 130 MM HG: CPT | Performed by: INTERNAL MEDICINE

## 2024-01-19 PROCEDURE — 3078F DIAST BP <80 MM HG: CPT | Performed by: INTERNAL MEDICINE

## 2024-01-19 RX ORDER — ALPRAZOLAM 0.5 MG/1
0.5 TABLET ORAL
Qty: 30 TABLET | Refills: 0 | Status: SHIPPED | OUTPATIENT
Start: 2024-01-19 | End: 2024-02-18

## 2024-01-19 RX ORDER — PANTOPRAZOLE SODIUM 20 MG/1
20 TABLET, DELAYED RELEASE ORAL DAILY
Qty: 90 TABLET | Refills: 3 | Status: SHIPPED | OUTPATIENT
Start: 2024-01-19

## 2024-01-19 NOTE — LETTER
[facility]  Giuliana Matthew M.D.  [address]  [place] [address]  Fax: [phone]   Authorization for Release/Disclosure of   Protected Health Information   Name: TULIO GUERRIER : 1960 SSN: [id number]   Address: [address] [place] [address] [place] [address] Phone:    [phone] (home)    I authorize the entity listed below to release/disclose the PHI below to:   [facility]/Giuliana Matthew M.D. and Giuliana Matthew M.D.   Provider or Entity Name: [facility]     Address   City, State, [facility]   Phone:      Fax: [phone]     Reason for request: continuity of care   Information to be released:    [X] LAST COLONOSCOPY,  including any PATH REPORT and follow-up  [  ] LAST FIT/COLOGUARD RESULT [  ] LAST DEXA  [  ] LAST MAMMOGRAM  [  ] LAST PAP  [  ] LAST LABS [  ] RETINA EXAM REPORT  [  ] IMMUNIZATION RECORDS  [  ] Release all info      [  ] Check here and initial the line next to each item to release ALL health information INCLUDING  _____ Care and treatment for drug and / or alcohol abuse  _____ HIV testing, infection status, or AIDS  _____ Genetic Testing    DATES OF SERVICE OR TIME PERIOD TO BE DISCLOSED: _____________  I understand and acknowledge that:  * This Authorization may be revoked at any time by you in writing, except if your health information has already been used or disclosed.  * Your health information that will be used or disclosed as a result of you signing this authorization could be re-disclosed by the recipient. If this occurs, your re-disclosed health information may no longer be protected by State or Federal laws.  * You may refuse to sign this Authorization. Your refusal will not affect your ability to obtain treatment.  * This Authorization becomes effective upon signing and will  on (date) __________.      If no date is indicated, this Authorization will  one (1) year from the signature date.    Name: Tulio Guerrier  Signature: Date:   2024     PLEASE FAX  REQUESTED RECORDS BACK TO: [phone]

## 2024-01-19 NOTE — PROGRESS NOTES
New Patient to Establish      CC: Review of chronic medical problems    HPI:   Yanira is a 63 y.o. female who came into clinic for above.    She is generally healthy apart from high cholesterol.  She does love cheese, sugary food.  She also has family history of high cholesterol.   She is currently trying red beet root powder since last April. Last seen by PCP at [facility] a year ago.  She has hiatal hernia with GERD.  She is on Protonix for that.  She did not tolerate tapering before.    She lost her mom and multiple relatives around the same time 1 and half years ago.  Since then, she occasionally wakes up and cannot go back to sleep. She takes 0.25 mg xanax for that. She uses about 60 tabs of 0.5 mg a year.    Diet is not optimal due to love for sweet food and having junk food with kids in the house. She loves outdoors sports. Pickle ball x 3/week, hiking, skiing.     ROS:   As above    Patient Active Problem List    Diagnosis Date Noted    Agatston coronary artery calcium score less than 100 12/06/2019    H/O hysterectomy 06/02/2019    Family history of mixed hyperlipidemia 11/07/2018    Gastroesophageal reflux disease with esophagitis 09/05/2018    Pure hypercholesterolemia 04/23/2018    Chronic seasonal allergic rhinitis due to pollen 04/23/2018    MINE (generalized anxiety disorder) 04/23/2018       Past Medical History:   Diagnosis Date    Heart burn     Indigestion     Pain 02/01/2016    from gallbladder       Current Outpatient Medications   Medication Sig Dispense Refill    pantoprazole (PROTONIX) 20 MG tablet Take 1 Tablet by mouth every day. 90 Tablet 3    ALPRAZolam (XANAX) 0.5 MG Tab Take 1 Tablet by mouth 1 time a day as needed for Anxiety for up to 30 days. 30 Tablet 0    estradiol (VAGIFEM) 10 MCG Tab Imvexxy Maintenance Pack 10 mcg vaginal insert      Omega-3 Fatty Acids (FISH OIL TRIPLE STRENGTH) 1400 MG Cap Take 2,800 mg by mouth every day.      Probiotic Product (PROBIOMAX DAILY DF) Cap Take  by  mouth.      Psyllium 0.52 GM Cap Take  by mouth. 540 Cap     Calcium-Magnesium-Vitamin D (CALCIUM 500 PO) Take  by mouth every day.       No current facility-administered medications for this visit.       Allergies as of 01/19/2024    (No Known Allergies)       Social History     Socioeconomic History    Marital status:      Spouse name: Not on file    Number of children: Not on file    Years of education: Not on file    Highest education level: Bachelor's degree (e.g., BA, AB, BS)   Occupational History    Not on file   Tobacco Use    Smoking status: Never    Smokeless tobacco: Never   Vaping Use    Vaping Use: Never used   Substance and Sexual Activity    Alcohol use: Yes     Alcohol/week: 0.6 - 1.2 oz     Types: 1 - 2 Shots of liquor per week     Comment: occ    Drug use: No    Sexual activity: Yes     Partners: Male   Other Topics Concern    Not on file   Social History Narrative    Not on file     Social Determinants of Health     Financial Resource Strain: Low Risk  (1/18/2024)    Overall Financial Resource Strain (CARDIA)     Difficulty of Paying Living Expenses: Not hard at all   Food Insecurity: No Food Insecurity (1/18/2024)    Hunger Vital Sign     Worried About Running Out of Food in the Last Year: Never true     Ran Out of Food in the Last Year: Never true   Transportation Needs: No Transportation Needs (1/18/2024)    PRAPARE - Transportation     Lack of Transportation (Medical): No     Lack of Transportation (Non-Medical): No   Physical Activity: Sufficiently Active (1/18/2024)    Exercise Vital Sign     Days of Exercise per Week: 7 days     Minutes of Exercise per Session: 30 min   Stress: Stress Concern Present (1/18/2024)    [employer] - Occupational Stress Questionnaire     Feeling of Stress : To some extent   Social Connections: Socially Integrated (1/18/2024)    Social Connection and Isolation Panel [NHANES]     Frequency of Communication with Friends and  "Family: More than three times a week     Frequency of Social Gatherings with Friends and Family: Once a week     Attends [employer] Services: More than 4 times per year     Active Member of Clubs or Organizations: Yes     Attends Club or Organization Meetings: More than 4 times per year     Marital Status:    Intimate Partner Violence: Not on file   Housing Stability: Low Risk  (1/18/2024)    Housing Stability Vital Sign     Unable to Pay for Housing in the Last Year: No     Number of Places Lived in the Last Year: 1     Unstable Housing in the Last Year: No       Family History   Problem Relation Age of Onset    Cancer Mother         Basal Cell Carcinoma    Other Mother         CLL?    Lung Disease Father         Interstitial Lung Disease    Cancer Sister 70        breast    No Known Problems Brother     No Known Problems Brother     Cancer Paternal Aunt         Uterine CA    Cancer Paternal Uncle         Throat CA d/t smoking    Alcohol/Drug Paternal Uncle         Smoking    Stroke Neg Hx     Heart Attack Neg Hx        Past Surgical History:   Procedure Laterality Date    FAWN BY LAPAROSCOPY N/A 2/16/2016    Procedure: FAWN BY LAPAROSCOPY;  Surgeon: Clifton Vinson M.D.;  Location: SURGERY [facility];  Service:     GYN SURGERY  2008    hysterectomy    OTHER      tonsillectomy as a child         /60 (BP Location: Left arm, Patient Position: Sitting, BP Cuff Size: Adult)   Pulse 71   Temp 36.2 °C (97.1 °F) (Temporal)   Ht 1.753 m (5' 9\")   Wt 73.9 kg (163 lb)   LMP 02/09/2008 (Approximate)   SpO2 97%   BMI 24.07 kg/m²     Physical Exam  General: Alert and oriented, No apparent distress.  Eyes: No scleral icterus.  Throat: Clear no erythema or exudates noted.  Neck: Supple. No cervical or supraclavicular lymphadenopathy noted. Thyroid not enlarged.  Lungs: Clear to auscultation bilaterally without any wheezing, crepitations.  Cardiovascular: Regular rate and rhythm. No murmurs, rubs or " gallops.  Abdomen: Bowel sound +, soft, non tender, no rebound or guarding, no palpable organomegaly  Extremities: No edema.         Assessment and Plan    1. Pure hypercholesterolemia  Coronary calcium score was 0 in 2019.  Reassess this year.  - CT-CARDIAC SCORING; Future    2. Family history of diabetes mellitus in mother  She would like to know her A1c.  - HEMOGLOBIN A1C; Future    3. Gastroesophageal reflux disease with esophagitis, unspecified whether hemorrhage  -Stable.  Continue pantoprazole (PROTONIX) 20 MG tablet; Take 1 Tablet by mouth every day.  Dispense: 90 Tablet; Refill: 3    4. MINE (generalized anxiety disorder)  Discussed cognitive side effects of long-term benzodiazepine use.  She will need another refill for this year.  reviewed. Refilled.  - ALPRAZolam (XANAX) 0.5 MG Tab; Take 1 Tablet by mouth 1 time a day as needed for Anxiety for up to 30 days.  Dispense: 30 Tablet; Refill: 0    5. Osteopenia, unspecified location  Dexa done with gyn last fall.  Osteopenia.  She is on vitamin D and calcium.  - VITAMIN D,25 HYDROXY (DEFICIENCY); Future    6. Well adult exam  - CBC WITH DIFFERENTIAL; Future  - Comp Metabolic Panel; Future  - Lipid Profile; Future  - TSH WITH REFLEX TO FT4; Future    7. Need for vaccination  Recommended Shingrix at the pharmacy.    Colonoscopy was done last year at [facility], 10 year recall.    Followup: Return in about 1 year (around 1/19/2025) for Annual wellness visit.           Signed by: Giuliana Matthew M.D.

## 2024-02-16 ENCOUNTER — APPOINTMENT (OUTPATIENT)
Dept: RADIOLOGY | Facility: MEDICAL CENTER | Age: 64
End: 2024-02-16
Attending: INTERNAL MEDICINE
Payer: COMMERCIAL

## 2024-03-19 ENCOUNTER — APPOINTMENT (OUTPATIENT)
Dept: RADIOLOGY | Facility: MEDICAL CENTER | Age: 64
End: 2024-03-19
Attending: INTERNAL MEDICINE

## 2024-03-27 ENCOUNTER — HOSPITAL ENCOUNTER (OUTPATIENT)
Dept: RADIOLOGY | Facility: MEDICAL CENTER | Age: 64
End: 2024-03-27
Attending: INTERNAL MEDICINE
Payer: COMMERCIAL

## 2024-03-27 DIAGNOSIS — E78.00 PURE HYPERCHOLESTEROLEMIA: ICD-10-CM

## 2024-03-27 PROCEDURE — 4410556 CT-CARDIAC SCORING (SELF PAY ONLY)

## 2024-03-28 LAB
25(OH)D3+25(OH)D2 SERPL-MCNC: 79.7 NG/ML (ref 30–100)
ALBUMIN SERPL-MCNC: 4.6 G/DL (ref 3.9–4.9)
ALBUMIN/GLOB SERPL: 2.4 {RATIO} (ref 1.2–2.2)
ALP SERPL-CCNC: 69 IU/L (ref 44–121)
ALT SERPL-CCNC: 13 IU/L (ref 0–32)
AST SERPL-CCNC: 21 IU/L (ref 0–40)
BASOPHILS # BLD AUTO: 0 X10E3/UL (ref 0–0.2)
BASOPHILS NFR BLD AUTO: 1 %
BILIRUB SERPL-MCNC: 0.8 MG/DL (ref 0–1.2)
BUN SERPL-MCNC: 8 MG/DL (ref 8–27)
BUN/CREAT SERPL: 10 (ref 12–28)
CALCIUM SERPL-MCNC: 9.1 MG/DL (ref 8.7–10.3)
CHLORIDE SERPL-SCNC: 99 MMOL/L (ref 96–106)
CHOLEST SERPL-MCNC: 243 MG/DL (ref 100–199)
CO2 SERPL-SCNC: 22 MMOL/L (ref 20–29)
CREAT SERPL-MCNC: 0.82 MG/DL (ref 0.57–1)
EGFRCR SERPLBLD CKD-EPI 2021: 80 ML/MIN/1.73
EOSINOPHIL # BLD AUTO: 0.2 X10E3/UL (ref 0–0.4)
EOSINOPHIL NFR BLD AUTO: 4 %
ERYTHROCYTE [DISTWIDTH] IN BLOOD BY AUTOMATED COUNT: 12.7 % (ref 11.7–15.4)
GLOBULIN SER CALC-MCNC: 1.9 G/DL (ref 1.5–4.5)
GLUCOSE SERPL-MCNC: 96 MG/DL (ref 70–99)
HBA1C MFR BLD: 5.5 % (ref 4.8–5.6)
HCT VFR BLD AUTO: 44.2 % (ref 34–46.6)
HDLC SERPL-MCNC: 91 MG/DL
HGB BLD-MCNC: 14.4 G/DL (ref 11.1–15.9)
IMM GRANULOCYTES # BLD AUTO: 0 X10E3/UL (ref 0–0.1)
IMM GRANULOCYTES NFR BLD AUTO: 0 %
IMMATURE CELLS  115398: NORMAL
LABORATORY COMMENT REPORT: ABNORMAL
LDLC SERPL CALC-MCNC: 141 MG/DL (ref 0–99)
LYMPHOCYTES # BLD AUTO: 1.8 X10E3/UL (ref 0.7–3.1)
LYMPHOCYTES NFR BLD AUTO: 34 %
MCH RBC QN AUTO: 29.1 PG (ref 26.6–33)
MCHC RBC AUTO-ENTMCNC: 32.6 G/DL (ref 31.5–35.7)
MCV RBC AUTO: 89 FL (ref 79–97)
MONOCYTES # BLD AUTO: 0.4 X10E3/UL (ref 0.1–0.9)
MONOCYTES NFR BLD AUTO: 7 %
MORPHOLOGY BLD-IMP: NORMAL
NEUTROPHILS # BLD AUTO: 2.8 X10E3/UL (ref 1.4–7)
NEUTROPHILS NFR BLD AUTO: 54 %
NRBC BLD AUTO-RTO: NORMAL %
PLATELET # BLD AUTO: 247 X10E3/UL (ref 150–450)
POTASSIUM SERPL-SCNC: 3.9 MMOL/L (ref 3.5–5.2)
PROT SERPL-MCNC: 6.5 G/DL (ref 6–8.5)
RBC # BLD AUTO: 4.95 X10E6/UL (ref 3.77–5.28)
SODIUM SERPL-SCNC: 140 MMOL/L (ref 134–144)
TRIGL SERPL-MCNC: 69 MG/DL (ref 0–149)
TSH SERPL DL<=0.005 MIU/L-ACNC: 2.03 UIU/ML (ref 0.45–4.5)
VLDLC SERPL CALC-MCNC: 11 MG/DL (ref 5–40)
WBC # BLD AUTO: 5.2 X10E3/UL (ref 3.4–10.8)

## 2024-05-15 LAB
25(OH)D3+25(OH)D2 SERPL-MCNC: 79.7 NG/ML (ref 30–100)
ALBUMIN SERPL-MCNC: NORMAL G/DL (ref 3.9–4.9)
ALBUMIN/GLOB SERPL: NORMAL {RATIO} (ref 1.2–2.2)
ALP SERPL-CCNC: NORMAL IU/L (ref 44–121)
ALT SERPL-CCNC: NORMAL IU/L (ref 0–32)
AST SERPL-CCNC: NORMAL IU/L (ref 0–40)
BASOPHILS # BLD AUTO: 0 X10E3/UL (ref 0–0.2)
BASOPHILS NFR BLD AUTO: 1 %
BILIRUB SERPL-MCNC: NORMAL MG/DL (ref 0–1.2)
BUN SERPL-MCNC: NORMAL MG/DL (ref 8–27)
BUN/CREAT SERPL: NORMAL (ref 12–28)
CALCIUM SERPL-MCNC: NORMAL MG/DL (ref 8.7–10.3)
CHLORIDE SERPL-SCNC: 99 MMOL/L (ref 96–106)
CHOLEST SERPL-MCNC: NORMAL MG/DL (ref 100–199)
CO2 SERPL-SCNC: NORMAL MMOL/L (ref 20–29)
CREAT SERPL-MCNC: NORMAL MG/DL (ref 0.57–1)
EGFRCR SERPLBLD CKD-EPI 2021: NORMAL ML/MIN/1.73
EOSINOPHIL # BLD AUTO: 0.2 X10E3/UL (ref 0–0.4)
EOSINOPHIL NFR BLD AUTO: 4 %
ERYTHROCYTE [DISTWIDTH] IN BLOOD BY AUTOMATED COUNT: 12.7 % (ref 11.7–15.4)
GLOBULIN SER CALC-MCNC: NORMAL G/DL (ref 1.5–4.5)
GLUCOSE SERPL-MCNC: NORMAL MG/DL (ref 70–99)
HBA1C MFR BLD: 5.5 % (ref 4.8–5.6)
HCT VFR BLD AUTO: 44.2 % (ref 34–46.6)
HDLC SERPL-MCNC: 91 MG/DL
HGB BLD-MCNC: 14.4 G/DL (ref 11.1–15.9)
IMM GRANULOCYTES # BLD AUTO: 0 X10E3/UL (ref 0–0.1)
IMM GRANULOCYTES NFR BLD AUTO: 0 %
LABORATORY COMMENT REPORT: NORMAL
LDLC SERPL CALC-MCNC: NORMAL MG/DL (ref 0–99)
LYMPHOCYTES # BLD AUTO: 1.8 X10E3/UL (ref 0.7–3.1)
LYMPHOCYTES NFR BLD AUTO: 34 %
MCH RBC QN AUTO: 29.1 PG (ref 26.6–33)
MCHC RBC AUTO-ENTMCNC: 32.6 G/DL (ref 31.5–35.7)
MCV RBC AUTO: 89 FL (ref 79–97)
MONOCYTES # BLD AUTO: 0.4 X10E3/UL (ref 0.1–0.9)
MONOCYTES NFR BLD AUTO: 7 %
NEUTROPHILS # BLD AUTO: 2.8 X10E3/UL (ref 1.4–7)
NEUTROPHILS NFR BLD AUTO: 54 %
PLATELET # BLD AUTO: 247 X10E3/UL (ref 150–450)
POTASSIUM SERPL-SCNC: 3.9 MMOL/L (ref 3.5–5.2)
PROT SERPL-MCNC: NORMAL G/DL (ref 6–8.5)
RBC # BLD AUTO: 4.95 X10E6/UL (ref 3.77–5.28)
SODIUM SERPL-SCNC: 140 MMOL/L (ref 134–144)
TRIGL SERPL-MCNC: NORMAL MG/DL (ref 0–149)
TSH SERPL DL<=0.005 MIU/L-ACNC: 2.03 UIU/ML (ref 0.45–4.5)
VLDLC SERPL CALC-MCNC: NORMAL MG/DL (ref 5–40)
WBC # BLD AUTO: 5.2 X10E3/UL (ref 3.4–10.8)

## 2024-06-03 ENCOUNTER — APPOINTMENT (RX ONLY)
Dept: URBAN - METROPOLITAN AREA CLINIC 35 | Facility: CLINIC | Age: 64
Setting detail: DERMATOLOGY
End: 2024-06-03

## 2024-06-03 DIAGNOSIS — D36.1 BENIGN NEOPLASM OF PERIPHERAL NERVES AND AUTONOMIC NERVOUS SYSTEM: ICD-10-CM

## 2024-06-03 DIAGNOSIS — D485 NEOPLASM OF UNCERTAIN BEHAVIOR OF SKIN: ICD-10-CM

## 2024-06-03 PROBLEM — D48.5 NEOPLASM OF UNCERTAIN BEHAVIOR OF SKIN: Status: ACTIVE | Noted: 2024-06-03

## 2024-06-03 PROCEDURE — ? BIOPSY BY SHAVE METHOD

## 2024-06-03 PROCEDURE — 11102 TANGNTL BX SKIN SINGLE LES: CPT

## 2024-06-03 PROCEDURE — ? OBSERVATION AND MEASURE

## 2024-06-03 PROCEDURE — ? ADDITIONAL NOTES

## 2024-06-03 PROCEDURE — 99212 OFFICE O/P EST SF 10 MIN: CPT | Mod: 25

## 2024-06-03 ASSESSMENT — LOCATION SIMPLE DESCRIPTION DERM
LOCATION SIMPLE: LEFT ANTERIOR NECK
LOCATION SIMPLE: LEFT POSTERIOR UPPER ARM

## 2024-06-03 ASSESSMENT — LOCATION DETAILED DESCRIPTION DERM
LOCATION DETAILED: LEFT SUPERIOR LATERAL NECK
LOCATION DETAILED: LEFT DISTAL LATERAL POSTERIOR UPPER ARM

## 2024-06-03 ASSESSMENT — LOCATION ZONE DERM
LOCATION ZONE: NECK
LOCATION ZONE: ARM

## 2024-06-03 NOTE — PROCEDURE: BIOPSY BY SHAVE METHOD
Detail Level: Detailed
Depth Of Biopsy: dermis
Was A Bandage Applied: Yes
Size Of Lesion In Cm: 1
X Size Of Lesion In Cm: 0
Biopsy Type: H and E
Biopsy Method: double edge Personna blade
Anesthesia Type: 1% lidocaine without epinephrine
Anesthesia Volume In Cc: 1.8
Hemostasis: Aluminum Chloride
Wound Care: Petrolatum
Dressing: bandage
Destruction After The Procedure: No
Type Of Destruction Used: Curettage
Curettage Text: The wound bed was treated with curettage after the biopsy was performed.
Cryotherapy Text: The wound bed was treated with cryotherapy after the biopsy was performed.
Electrodesiccation Text: The wound bed was treated with electrodesiccation after the biopsy was performed.
Electrodesiccation And Curettage Text: The wound bed was treated with electrodesiccation and curettage after the biopsy was performed.
Silver Nitrate Text: The wound bed was treated with silver nitrate after the biopsy was performed.
Lab: [id number]
Lab Facility: 
Consent: Written consent was obtained and risks were reviewed including but not limited to scarring, infection, bleeding, scabbing, incomplete removal, nerve damage and allergy to anesthesia.
Post-Care Instructions: I reviewed with the patient in detail post-care instructions. Keep the biopsy site dry overnight, and then change the dressing once daily and apply a thin layer of petrolatum with a clean q-tip. \\nShowers are OK after 24 hours.  Allow clean water to run over the area.  Do not touch the biopsy site with your hands.  Do not immerse the biopsy site in water such as going into a swimming pool or bathtub until the sutures are removed.  If the biopsy site gets more painful with time, red, or drains, this is concerning for infection.  If you have signs of infection please call the office to come in for a walk in visit Monday through Friday 8:30 am to 12 pm or 1 pm to 4:30 pm.  If we are not in the office, please call through the answering service.
Notification Instructions: Patient will be notified of biopsy results. However, patient instructed to call the office if not contacted within 2 weeks.
Billing Type: Third-Party Bill
Information: Selecting Yes will display possible errors in your note based on the variables you have selected. This validation is only offered as a suggestion for you. PLEASE NOTE THAT THE VALIDATION TEXT WILL BE REMOVED WHEN YOU FINALIZE YOUR NOTE. IF YOU WANT TO FAX A PRELIMINARY NOTE YOU WILL NEED TO TOGGLE THIS TO 'NO' IF YOU DO NOT WANT IT IN YOUR FAXED NOTE.

## 2024-06-03 NOTE — PROCEDURE: ADDITIONAL NOTES
Render Risk Assessment In Note?: no
Additional Notes: Discussed shave removal if bothersome. Patient given phone number to call if she desires removal and PA will obtained at that time.
Detail Level: Simple

## 2024-06-10 ENCOUNTER — APPOINTMENT (RX ONLY)
Dept: URBAN - METROPOLITAN AREA CLINIC 35 | Facility: CLINIC | Age: 64
Setting detail: DERMATOLOGY
End: 2024-06-10

## 2024-06-10 DIAGNOSIS — D485 NEOPLASM OF UNCERTAIN BEHAVIOR OF SKIN: ICD-10-CM

## 2024-06-10 PROBLEM — D48.5 NEOPLASM OF UNCERTAIN BEHAVIOR OF SKIN: Status: ACTIVE | Noted: 2024-06-10

## 2024-06-10 PROCEDURE — ? ORDER TESTS

## 2024-06-10 PROCEDURE — ? POST-OP WOUND CHECK

## 2024-06-10 PROCEDURE — ? TREATMENT REGIMEN

## 2024-06-10 ASSESSMENT — LOCATION DETAILED DESCRIPTION DERM: LOCATION DETAILED: LEFT DISTAL LATERAL POSTERIOR UPPER ARM

## 2024-06-10 ASSESSMENT — LOCATION SIMPLE DESCRIPTION DERM: LOCATION SIMPLE: LEFT POSTERIOR UPPER ARM

## 2024-06-10 ASSESSMENT — LOCATION ZONE DERM: LOCATION ZONE: ARM

## 2024-06-10 NOTE — PROCEDURE: POST-OP WOUND CHECK
Detail Level: Detailed
Add 14825 Cpt? (Important Note: In 2017 The Use Of 83644 Is Being Tracked By Cms To Determine Future Global Period Reimbursement For Global Periods): no
Quality 357: Surgical Site Infection (Ssi): Surgical site infection
Wound Evaluated By: Dr. Neva Jean

## 2024-06-10 NOTE — PROCEDURE: ORDER TESTS
Bill For Surgical Tray: no
Expected Date Of Service: 06/10/2024
Performing Laboratory: 0
Billing Type: Third-Party Bill

## 2024-06-10 NOTE — PROCEDURE: TREATMENT REGIMEN
Plan: Patient to return to clinic in 3 days for reevaluation.  If this worsens would start oral antibiotics.\\nLeave bandage off due to adhesive reaction.
Detail Level: Zone
Initiate Treatment: Apply OTC Neosporin directly on wound 2-3 times daily

## 2024-12-09 ENCOUNTER — APPOINTMENT (OUTPATIENT)
Dept: URBAN - METROPOLITAN AREA CLINIC 35 | Facility: CLINIC | Age: 64
Setting detail: DERMATOLOGY
End: 2024-12-09

## 2024-12-09 DIAGNOSIS — D22 MELANOCYTIC NEVI: ICD-10-CM

## 2024-12-09 DIAGNOSIS — L72.0 EPIDERMAL CYST: ICD-10-CM

## 2024-12-09 DIAGNOSIS — L82.0 INFLAMED SEBORRHEIC KERATOSIS: ICD-10-CM

## 2024-12-09 PROBLEM — D22.5 MELANOCYTIC NEVI OF TRUNK: Status: ACTIVE | Noted: 2024-12-09

## 2024-12-09 PROBLEM — D22.4 MELANOCYTIC NEVI OF SCALP AND NECK: Status: ACTIVE | Noted: 2024-12-09

## 2024-12-09 PROCEDURE — ? COUNSELING

## 2024-12-09 PROCEDURE — ? BENIGN DESTRUCTION

## 2024-12-09 PROCEDURE — ? COSMETIC EXTRACTIONS

## 2024-12-09 PROCEDURE — ? ADDITIONAL NOTES

## 2024-12-09 PROCEDURE — 17110 DESTRUCTION B9 LES UP TO 14: CPT

## 2024-12-09 PROCEDURE — 99212 OFFICE O/P EST SF 10 MIN: CPT | Mod: 25

## 2024-12-09 ASSESSMENT — LOCATION SIMPLE DESCRIPTION DERM
LOCATION SIMPLE: RIGHT EYEBROW
LOCATION SIMPLE: RIGHT UPPER BACK
LOCATION SIMPLE: ABDOMEN
LOCATION SIMPLE: LEFT ANTERIOR NECK

## 2024-12-09 ASSESSMENT — LOCATION ZONE DERM
LOCATION ZONE: TRUNK
LOCATION ZONE: NECK
LOCATION ZONE: FACE

## 2024-12-09 ASSESSMENT — LOCATION DETAILED DESCRIPTION DERM
LOCATION DETAILED: SUBXIPHOID
LOCATION DETAILED: RIGHT MID-UPPER BACK
LOCATION DETAILED: RIGHT MEDIAL EYEBROW
LOCATION DETAILED: LEFT INFERIOR LATERAL NECK

## 2024-12-09 NOTE — PROCEDURE: ADDITIONAL NOTES
Additional Notes: Will plan on shave removal if continues to be irritated
Detail Level: Simple
Render Risk Assessment In Note?: no

## 2024-12-09 NOTE — PROCEDURE: BENIGN DESTRUCTION
Medical Necessity Information: It is in your best interest to select a reason for this procedure from the list below. All of these items fulfill various CMS LCD requirements except the new and changing color options.
Medical Necessity Clause: This procedure was medically necessary because the lesions that were treated were:
Number Of Freeze-Thaw Cycles: 2 freeze-thaw cycles
Consent: The patient's consent was obtained including but not limited to risks of crusting, scabbing, blistering, scarring, darker or lighter pigmentary change, recurrence, incomplete removal and infection.
Add 52 Modifier (Optional): no
Anesthesia Type: 1% Xylocaine without epinephrine
Anesthesia Volume In Cc: 0.5
Treatment Number (Will Not Render If 0): 0
Detail Level: Detailed
Post-Care Instructions: I reviewed with the patient in detail post-care instructions. Patient is to wear sunprotection, and avoid picking at any of the treated lesions. Pt may apply Vaseline to crusted or scabbing areas.